# Patient Record
Sex: MALE | Race: OTHER | Employment: FULL TIME | URBAN - METROPOLITAN AREA
[De-identification: names, ages, dates, MRNs, and addresses within clinical notes are randomized per-mention and may not be internally consistent; named-entity substitution may affect disease eponyms.]

---

## 2018-08-30 ENCOUNTER — APPOINTMENT (OUTPATIENT)
Dept: OTHER | Facility: HOSPITAL | Age: 28
End: 2018-08-30
Attending: FAMILY MEDICINE

## 2020-02-14 ENCOUNTER — OFFICE VISIT (OUTPATIENT)
Dept: FAMILY MEDICINE CLINIC | Facility: CLINIC | Age: 30
End: 2020-02-14
Payer: COMMERCIAL

## 2020-02-14 VITALS
HEIGHT: 68 IN | DIASTOLIC BLOOD PRESSURE: 79 MMHG | BODY MASS INDEX: 33.95 KG/M2 | RESPIRATION RATE: 16 BRPM | OXYGEN SATURATION: 100 % | SYSTOLIC BLOOD PRESSURE: 149 MMHG | WEIGHT: 224 LBS | TEMPERATURE: 98 F | HEART RATE: 60 BPM

## 2020-02-14 DIAGNOSIS — J20.9 ACUTE BRONCHITIS, UNSPECIFIED ORGANISM: Primary | ICD-10-CM

## 2020-02-14 PROCEDURE — 99202 OFFICE O/P NEW SF 15 MIN: CPT | Performed by: NURSE PRACTITIONER

## 2020-02-14 RX ORDER — BENZONATATE 200 MG/1
200 CAPSULE ORAL 3 TIMES DAILY PRN
Qty: 15 CAPSULE | Refills: 0 | Status: SHIPPED | OUTPATIENT
Start: 2020-02-14 | End: 2020-02-19

## 2020-02-14 NOTE — PROGRESS NOTES
CHIEF COMPLAINT:   Patient presents with:  Cough        HPI:   Darnell Day is a 34year old male who presents for cough for  1  weeks. Cough has been persisting.  Nasal congestion, cough, and chills started 1 week ago and all symptoms resolved 2 days ag LYMPH: No cervical or supraclavicular lymphadenopathy. EXTREMITIES:  No clubbing, cyanosis, or edema.     ASSESSMENT AND PLAN:   Charlette Jj is a 34year old male who presents with:     ASSESSMENT:  Acute bronchitis, unspecified organism  (primary enco When bronchitis develops, the airways become swollen. The airways may also become infected with bacteria. This is known as a secondary infection.   Diagnosing acute bronchitis  Your healthcare provider will examine you and ask about your symptoms and health · Symptoms that don’t start to improve within a week, or within 3 days of taking antibiotics   Date Last Reviewed: 12/1/2016  © 7300-4153 [employer]. [address] [facility], [address] [place] [address]. All rights reserved.  This information is not int

## 2020-04-10 ENCOUNTER — OFFICE VISIT (OUTPATIENT)
Dept: FAMILY MEDICINE CLINIC | Facility: CLINIC | Age: 30
End: 2020-04-10
Payer: COMMERCIAL

## 2020-04-10 VITALS
RESPIRATION RATE: 18 BRPM | OXYGEN SATURATION: 100 % | DIASTOLIC BLOOD PRESSURE: 78 MMHG | TEMPERATURE: 98 F | SYSTOLIC BLOOD PRESSURE: 140 MMHG | HEART RATE: 58 BPM | BODY MASS INDEX: 31.52 KG/M2 | HEIGHT: 68 IN | WEIGHT: 208 LBS

## 2020-04-10 DIAGNOSIS — R03.0 ELEVATED BLOOD-PRESSURE READING, WITHOUT DIAGNOSIS OF HYPERTENSION: ICD-10-CM

## 2020-04-10 DIAGNOSIS — J30.89 ENVIRONMENTAL AND SEASONAL ALLERGIES: Primary | ICD-10-CM

## 2020-04-10 DIAGNOSIS — J01.00 ACUTE NON-RECURRENT MAXILLARY SINUSITIS: ICD-10-CM

## 2020-04-10 PROCEDURE — 99213 OFFICE O/P EST LOW 20 MIN: CPT

## 2020-04-10 RX ORDER — AMOXICILLIN 875 MG/1
875 TABLET, COATED ORAL 2 TIMES DAILY
Qty: 20 TABLET | Refills: 0 | Status: SHIPPED | OUTPATIENT
Start: 2020-04-11 | End: 2020-04-21

## 2020-04-10 NOTE — PROGRESS NOTES
CHIEF COMPLAINT:   Patient presents with:  Sinus Problem: congestion on and off -month, now nasal bleeding    HPI:   Charlette Jj is a 27year old male with hx of cold symptoms that have progressed to frontal and maxillary sinus congestion and pressure, LUNGS: clear to auscultation bilaterally, no wheezes or rhonchi. Breathing is non labored. CARDIO: RRR without murmur  LYMPH:  no lymphadenopathy.         ASSESSMENT AND PLAN:   Darnell Day is a 27year old male who presents with Patient presents with Mold loves dark, damp areas. It tends to grow in bathrooms, basements, refrigerators, and in the soil of houseplants. Mold reproduces by sending tiny grains called spores into the air.  If these spores are breathed in, they can cause a nasal allergic reacti

## 2020-04-10 NOTE — PATIENT INSTRUCTIONS
Causes of Nasal Allergies    Nasal allergies are most commonly caused by one or more of 4 kinds of allergens: pollen (which causes seasonal allergies), house-dust mites, mold, and animals.  Other substances, called irritants, can bother the nose and make

## 2020-09-03 ENCOUNTER — OFFICE VISIT (OUTPATIENT)
Dept: ORTHOPEDICS CLINIC | Facility: CLINIC | Age: 30
End: 2020-09-03
Payer: COMMERCIAL

## 2020-09-03 ENCOUNTER — HOSPITAL ENCOUNTER (OUTPATIENT)
Dept: GENERAL RADIOLOGY | Facility: HOSPITAL | Age: 30
Discharge: HOME | End: 2020-09-03
Attending: ORTHOPAEDIC SURGERY
Payer: COMMERCIAL

## 2020-09-03 DIAGNOSIS — M25.561 CHRONIC PAIN OF RIGHT KNEE: ICD-10-CM

## 2020-09-03 DIAGNOSIS — G89.29 CHRONIC PAIN OF RIGHT KNEE: ICD-10-CM

## 2020-09-03 DIAGNOSIS — S83.511A RUPTURE OF ANTERIOR CRUCIATE LIGAMENT OF RIGHT KNEE, INITIAL ENCOUNTER: Primary | ICD-10-CM

## 2020-09-03 PROCEDURE — 99204 OFFICE O/P NEW MOD 45 MIN: CPT | Performed by: ORTHOPAEDIC SURGERY

## 2020-09-03 PROCEDURE — 73564 X-RAY EXAM KNEE 4 OR MORE: CPT | Performed by: ORTHOPAEDIC SURGERY

## 2020-09-03 RX ORDER — MULTIVITAMIN
1 TABLET ORAL DAILY
COMMUNITY

## 2020-09-03 RX ORDER — IBUPROFEN 600 MG/1
600 TABLET ORAL DAILY PRN
COMMUNITY
End: 2021-04-15

## 2020-09-03 NOTE — PROGRESS NOTES
NURSING INTAKE COMMENTS: Patient presents with:  Consult: High school injury ACL tear and meniscus tear right knee. Works for [employer] getting hard to work. Looking to discuss surgery.        HPI: This 27year old male presents today with complaints of right k sore throat, headaches  RESPIRATORY: denies new shortness of breath, cough, asthma, wheezing  CARDIOVASCULAR: denies chest pain, leg cramps with exertion, palpitations, leg swelling  GI: denies abdominal pain, nausea, vomiting, diarrhea, constipation, hema on 9/03/2020 at 2:13 PM     Finalized by (CST): Filipe Lucero MD on 9/03/2020 at 2:15 PM             Labs:  No results found for: WBC, HGB, PLT   No results found for: GLU, BUN, CREATSERUM, GFR, GFRNAA, GFRAA     Assessment and Plan:  Diagnoses and all

## 2020-09-24 ENCOUNTER — TELEPHONE (OUTPATIENT)
Dept: ORTHOPEDICS CLINIC | Facility: CLINIC | Age: 30
End: 2020-09-24

## 2020-09-24 NOTE — TELEPHONE ENCOUNTER
Per pt has appointment for MRI tomorrow, pt very confused states insurance told him that Dr. Whit Holley does not consider MRI necessary. Please advise thank you.

## 2020-09-24 NOTE — TELEPHONE ENCOUNTER
Patient's case has been denied per [employer]IM online. [employer] is not considering this medically necessary. I was unable to reach anyone this evening. I will find out appeal information tomrorow.  Please notify the patient that his insurance has denied the MRI and we saud

## 2020-09-25 DIAGNOSIS — S83.511A RUPTURE OF ANTERIOR CRUCIATE LIGAMENT OF RIGHT KNEE, INITIAL ENCOUNTER: Primary | ICD-10-CM

## 2020-09-25 NOTE — TELEPHONE ENCOUNTER
Reviewed with [employer]. Sae Butt had called to try and get his pushed through on 9/16 but was told it was not meeting medical necessity because a course of PT has not been completed.      We can call [phone] to schedule a peer to peer attempt but it may have

## 2020-09-25 NOTE — TELEPHONE ENCOUNTER
Pt notified of MRI denial and reason. Advised him that PT ordered and gave him phone # to scheduling. He will also call his insurance to inquire about his cost r/t PT.  Advised him to f/u in 6 wks after PT and if he is still having pain we can try and resub

## 2020-09-25 NOTE — TELEPHONE ENCOUNTER
If MRI denied due to lack of conservative tx, we will go ahead and order some PT. Please advise the pt.

## 2020-10-09 ENCOUNTER — OFFICE VISIT (OUTPATIENT)
Dept: FAMILY MEDICINE CLINIC | Facility: CLINIC | Age: 30
End: 2020-10-09
Payer: COMMERCIAL

## 2020-10-09 VITALS
SYSTOLIC BLOOD PRESSURE: 137 MMHG | HEIGHT: 68 IN | OXYGEN SATURATION: 98 % | WEIGHT: 205 LBS | HEART RATE: 98 BPM | RESPIRATION RATE: 16 BRPM | TEMPERATURE: 99 F | DIASTOLIC BLOOD PRESSURE: 86 MMHG | BODY MASS INDEX: 31.07 KG/M2

## 2020-10-09 DIAGNOSIS — R09.82 POST-NASAL DRAINAGE: ICD-10-CM

## 2020-10-09 DIAGNOSIS — R09.81 NASAL CONGESTION: Primary | ICD-10-CM

## 2020-10-09 DIAGNOSIS — Z87.898 HX OF HEADACHE: ICD-10-CM

## 2020-10-09 PROCEDURE — 3008F BODY MASS INDEX DOCD: CPT | Performed by: PHYSICIAN ASSISTANT

## 2020-10-09 PROCEDURE — 3075F SYST BP GE 130 - 139MM HG: CPT | Performed by: PHYSICIAN ASSISTANT

## 2020-10-09 PROCEDURE — 3079F DIAST BP 80-89 MM HG: CPT | Performed by: PHYSICIAN ASSISTANT

## 2020-10-09 PROCEDURE — U0003 INFECTIOUS AGENT DETECTION BY NUCLEIC ACID (DNA OR RNA); SEVERE ACUTE RESPIRATORY SYNDROME CORONAVIRUS 2 (SARS-COV-2) (CORONAVIRUS DISEASE [COVID-19]), AMPLIFIED PROBE TECHNIQUE, MAKING USE OF HIGH THROUGHPUT TECHNOLOGIES AS DESCRIBED BY CMS-2020-01-R: HCPCS | Performed by: PHYSICIAN ASSISTANT

## 2020-10-09 PROCEDURE — 99213 OFFICE O/P EST LOW 20 MIN: CPT | Performed by: PHYSICIAN ASSISTANT

## 2020-10-09 NOTE — PROGRESS NOTES
CHIEF COMPLAINT:     Patient presents with:  Headache: states feels like a \"headcold\", stuffiness, congestion, mild cough, some phlegm, no fever      HPI:   Geovanni Rahman is a 27year old male who presents for cold symptoms for  2 days.  Symptoms h supple, non-tender  LUNGS: non labored breathing,  clear to auscultation bilaterally, no wheezing, rales, or rhonchi.   CARDIO: RRR    EXTREMITIES: no cyanosis, clubbing or edema  LYMPH:  no cervical, submandibular, periauricular, or supraclavicular lymphad

## 2020-10-20 ENCOUNTER — OFFICE VISIT (OUTPATIENT)
Dept: FAMILY MEDICINE CLINIC | Facility: CLINIC | Age: 30
End: 2020-10-20
Payer: COMMERCIAL

## 2020-10-20 ENCOUNTER — TELEPHONE (OUTPATIENT)
Dept: PHYSICAL THERAPY | Facility: HOSPITAL | Age: 30
End: 2020-10-20

## 2020-10-20 DIAGNOSIS — Z02.9 ADMINISTRATIVE ENCOUNTER: Primary | ICD-10-CM

## 2020-10-20 NOTE — PROGRESS NOTES
Patient covid positive 10/9/20. Patient request a retest to return to work. Discussed cdc symptom guidelines to return to work and the [facility] is not doing retesting due to possibility of testing positive for up to 3 months. Verbalized understanding.     No

## 2020-10-21 ENCOUNTER — OFFICE VISIT (OUTPATIENT)
Dept: PHYSICAL THERAPY | Facility: HOSPITAL | Age: 30
End: 2020-10-21
Attending: PHYSICIAN ASSISTANT
Payer: COMMERCIAL

## 2020-10-21 DIAGNOSIS — S83.511A RUPTURE OF ANTERIOR CRUCIATE LIGAMENT OF RIGHT KNEE, INITIAL ENCOUNTER: ICD-10-CM

## 2020-10-21 PROCEDURE — 97110 THERAPEUTIC EXERCISES: CPT

## 2020-10-21 PROCEDURE — 97162 PT EVAL MOD COMPLEX 30 MIN: CPT

## 2020-10-21 NOTE — PROGRESS NOTES
PHYSICAL THERAPY EVALUATION:   Referring Physician: Dr. 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 4 falls due to knee giving to touch  Standing posture- both hips in slight ER with feet pointing outwards  General swelling present in right knee- mild     Functional Movements  Double leg squat- good squat mechanics with posterior chain recruitment, no translation of knees past toe climbing, getting in and out of truck, and squatting. Participation restrictions include playing with his son, jogging, lifting weights at the gym, and working as a [employer] .  Skilled Physical Therapy is medically necessary to address the above impairme 2-3x/week without limitations. • The patient will be able to return to exercising at the gym without limitations. • The patient will be independent with a Hep for continued management and prevention of re-injury in the future.     Frequency / Duration: Pa

## 2020-10-26 ENCOUNTER — OFFICE VISIT (OUTPATIENT)
Dept: PHYSICAL THERAPY | Facility: HOSPITAL | Age: 30
End: 2020-10-26
Attending: PHYSICIAN ASSISTANT
Payer: COMMERCIAL

## 2020-10-26 PROCEDURE — 97110 THERAPEUTIC EXERCISES: CPT

## 2020-10-26 NOTE — PROGRESS NOTES
PHYSICAL THERAPY DAILY TREATMENT NOTE  Precautions: standard/universal  Date of Evaluation: 10/21/20  Diagnosis: rupture of anterior cruciate ligament, right knee    Insurance Type (# Auth): [employer] [facility]  Visit #: 2       Subjective: Leighton Munoz met in 8-12 weeks)  · The patient will demonstrate full right knee extension to neutral.  · The patient will demonstrate right knee flexion to at least 125 degrees.    · The patient will be able to squat to the ground to enable him to  objects off th

## 2020-10-28 ENCOUNTER — APPOINTMENT (OUTPATIENT)
Dept: PHYSICAL THERAPY | Facility: HOSPITAL | Age: 30
End: 2020-10-28
Attending: PHYSICIAN ASSISTANT
Payer: COMMERCIAL

## 2020-10-28 ENCOUNTER — TELEPHONE (OUTPATIENT)
Dept: PHYSICAL THERAPY | Facility: HOSPITAL | Age: 30
End: 2020-10-28

## 2020-11-02 ENCOUNTER — APPOINTMENT (OUTPATIENT)
Dept: PHYSICAL THERAPY | Facility: HOSPITAL | Age: 30
End: 2020-11-02
Attending: PHYSICIAN ASSISTANT
Payer: COMMERCIAL

## 2020-11-02 ENCOUNTER — TELEPHONE (OUTPATIENT)
Dept: PHYSICAL THERAPY | Age: 30
End: 2020-11-02

## 2020-11-04 ENCOUNTER — OFFICE VISIT (OUTPATIENT)
Dept: PHYSICAL THERAPY | Facility: HOSPITAL | Age: 30
End: 2020-11-04
Attending: PHYSICIAN ASSISTANT
Payer: COMMERCIAL

## 2020-11-04 PROCEDURE — 97110 THERAPEUTIC EXERCISES: CPT

## 2020-11-05 NOTE — PROGRESS NOTES
PHYSICAL THERAPY DAILY TREATMENT NOTE  Precautions: standard/universal  Date of Evaluation: 10/21/20  Diagnosis: rupture of anterior cruciate ligament, right knee    Insurance Type (# Auth): [facility] [facility]  Visit #: 3       Subjective: Layla Munoz tasks/demands. Jeremy Orozco will continue to benefit from skilled physical therapy interventions to improve function and achieve all established physical therapy goals.       Physical Therapy Goals  Goals: (to be met in 8-12 weeks)  · The patient wi

## 2020-11-09 ENCOUNTER — OFFICE VISIT (OUTPATIENT)
Dept: PHYSICAL THERAPY | Facility: HOSPITAL | Age: 30
End: 2020-11-09
Attending: PHYSICIAN ASSISTANT
Payer: COMMERCIAL

## 2020-11-09 PROCEDURE — 97110 THERAPEUTIC EXERCISES: CPT

## 2020-11-11 ENCOUNTER — OFFICE VISIT (OUTPATIENT)
Dept: PHYSICAL THERAPY | Facility: HOSPITAL | Age: 30
End: 2020-11-11
Attending: PHYSICIAN ASSISTANT
Payer: COMMERCIAL

## 2020-11-11 PROCEDURE — 97110 THERAPEUTIC EXERCISES: CPT

## 2020-11-12 NOTE — PROGRESS NOTES
PHYSICAL THERAPY DAILY TREATMENT NOTE  Precautions: standard/universal  Date of Evaluation: 10/21/20  Diagnosis: rupture of anterior cruciate ligament, right knee    Insurance Type (# Auth): [employer] [facility]  Visit #: 5       Subjective: Oral Munoz continues to tolerate strengthening well, however, we had to do NWB strengthening due to high pain levels from work. Balance on even and uneven surfaces continues to improve.  Pain levels have not improved as well as function continues to be limited with mo

## 2020-11-16 ENCOUNTER — OFFICE VISIT (OUTPATIENT)
Dept: PHYSICAL THERAPY | Facility: HOSPITAL | Age: 30
End: 2020-11-16
Attending: PHYSICIAN ASSISTANT
Payer: COMMERCIAL

## 2020-11-16 PROCEDURE — 97110 THERAPEUTIC EXERCISES: CPT

## 2020-11-17 NOTE — PROGRESS NOTES
PHYSICAL THERAPY DAILY TREATMENT NOTE  Precautions: standard/universal  Date of Evaluation: 10/21/20  Diagnosis: rupture of anterior cruciate ligament, right knee    Insurance Type (# Auth): [facility]  Visit #: 6       Subjective: Ethyl Hippo Cor Repeat 10 Times, Hold 1 Second(s), Complete 2 Sets, Perform 2 Times a Week      Assessment: Nash Jennings presented today with increased right knee pain, swelling, reduced range of motion in both flexion and extension, instability, and antalgic gai continued management and prevention of re-injury in the future    Total Treatment time: 15 mintues  Charges: 1 therex

## 2020-11-18 ENCOUNTER — OFFICE VISIT (OUTPATIENT)
Dept: PHYSICAL THERAPY | Facility: HOSPITAL | Age: 30
End: 2020-11-18
Attending: PHYSICIAN ASSISTANT
Payer: COMMERCIAL

## 2020-11-18 PROCEDURE — 97110 THERAPEUTIC EXERCISES: CPT

## 2020-11-19 NOTE — PROGRESS NOTES
PHYSICAL THERAPY DAILY TREATMENT NOTE  Precautions: standard/universal  Date of Evaluation: 10/21/20  Diagnosis: rupture of anterior cruciate ligament, right knee    Insurance Type (# Auth): [employer] [facility]  Visit #: 7       Subjective: Winston Munoz use of brace over the past two days. Able to work over the past two days with no feelings of instability or giving way when he wore the brace. Knee range of motion improved today as well as he is able to walk without a limp.  Miguel Ag scheduled a follow up vis

## 2020-12-03 ENCOUNTER — OFFICE VISIT (OUTPATIENT)
Dept: ORTHOPEDICS CLINIC | Facility: CLINIC | Age: 30
End: 2020-12-03
Payer: COMMERCIAL

## 2020-12-03 DIAGNOSIS — S83.511A RUPTURE OF ANTERIOR CRUCIATE LIGAMENT OF RIGHT KNEE, INITIAL ENCOUNTER: Primary | ICD-10-CM

## 2020-12-03 PROCEDURE — 99213 OFFICE O/P EST LOW 20 MIN: CPT | Performed by: ORTHOPAEDIC SURGERY

## 2020-12-03 NOTE — PROGRESS NOTES
NURSING INTAKE COMMENTS: Patient presents with:  Knee Pain: Pt is here for 3 month f/u. He completed physical therapy 2 weeks ago, pt states he noticed improvement. He said knee pain fluctuates. Pt reports pain at 4/10.   Pt tried to get MRI but was denied cough, asthma, wheezing  CARDIOVASCULAR: denies chest pain, leg cramps with exertion, palpitations, leg swelling  GI: denies abdominal pain, nausea, vomiting, diarrhea, constipation, hematochezia, worsening heartburn or stomach ulcers  : denies dysuria, reconstruction of the ACL. I will see him again after the imaging is completed. Continue with home exercises for now. Follow Up: Return for follow-up visit after ordered tests completed.     Navarro Bernal MD

## 2020-12-21 ENCOUNTER — HOSPITAL ENCOUNTER (OUTPATIENT)
Dept: MRI IMAGING | Age: 30
Discharge: HOME | End: 2020-12-21
Attending: ORTHOPAEDIC SURGERY
Payer: COMMERCIAL

## 2020-12-21 DIAGNOSIS — S83.511A RUPTURE OF ANTERIOR CRUCIATE LIGAMENT OF RIGHT KNEE, INITIAL ENCOUNTER: ICD-10-CM

## 2020-12-21 PROCEDURE — 73721 MRI JNT OF LWR EXTRE W/O DYE: CPT | Performed by: ORTHOPAEDIC SURGERY

## 2021-01-12 ENCOUNTER — TELEPHONE (OUTPATIENT)
Dept: ORTHOPEDICS CLINIC | Facility: CLINIC | Age: 31
End: 2021-01-12

## 2021-01-12 NOTE — TELEPHONE ENCOUNTER
Pt called stating pt had mri. Received results thru mychart. what is the next step.   If surgery, how much would it cost.  Please call pt

## 2021-01-12 NOTE — TELEPHONE ENCOUNTER
Spoke to pt and informed him of Lashanda Gerber, andriy. Scheduled pt appt with Meghann Loss for tomorrow at 4:20pm at [facility]. Pt verbalized understanding.

## 2021-01-13 ENCOUNTER — OFFICE VISIT (OUTPATIENT)
Dept: ORTHOPEDICS CLINIC | Facility: CLINIC | Age: 31
End: 2021-01-13
Payer: COMMERCIAL

## 2021-01-13 VITALS — BODY MASS INDEX: 33.34 KG/M2 | HEIGHT: 68 IN | WEIGHT: 220 LBS

## 2021-01-13 DIAGNOSIS — S83.511D RUPTURE OF ANTERIOR CRUCIATE LIGAMENT OF RIGHT KNEE, SUBSEQUENT ENCOUNTER: Primary | ICD-10-CM

## 2021-01-13 DIAGNOSIS — M23.41 CHONDRAL LOOSE BODY OF RIGHT KNEE JOINT: ICD-10-CM

## 2021-01-13 PROCEDURE — 3008F BODY MASS INDEX DOCD: CPT | Performed by: ORTHOPAEDIC SURGERY

## 2021-01-13 PROCEDURE — 99213 OFFICE O/P EST LOW 20 MIN: CPT | Performed by: ORTHOPAEDIC SURGERY

## 2021-01-13 NOTE — PROGRESS NOTES
NURSING INTAKE COMMENTS: Patient presents with:  Results: MRI right knee      HPI: This 27year old male presents today with complaints of right knee pain and instability follow-up. He underwent the MRI and is here for the results.   His symptoms are essen memory loss  PSYCHIATRIC: denies Hx of depression, anxiety, other psychiatric disorders  HEMATOLOGIC: denies blood clots, anemia, blood clotting disorders, blood transfusion  ENDOCRINE: denies autoimmune disease, thyroid issues, or diabetes  ALLERGY: denie lateral tibial spine and lateral tibial plateau. Remainder of the osseous structures are otherwise intact without acute fracture, dislocation, or marrow replacing lesion. JOINT FLUID: No joint effusion.   There is a large intra-articular joint body seen

## 2021-02-02 ENCOUNTER — TELEPHONE (OUTPATIENT)
Dept: ORTHOPEDICS CLINIC | Facility: CLINIC | Age: 31
End: 2021-02-02

## 2021-02-02 NOTE — TELEPHONE ENCOUNTER
Spoke with the patient and gave him three potential codes for his proposed surgery  [place], [facility], [place] [id number]

## 2021-02-22 ENCOUNTER — TELEPHONE (OUTPATIENT)
Dept: ORTHOPEDICS CLINIC | Facility: CLINIC | Age: 31
End: 2021-02-22

## 2021-02-22 DIAGNOSIS — S83.511D SPRAIN OF ANTERIOR CRUCIATE LIGAMENT OF RIGHT KNEE, SUBSEQUENT ENCOUNTER: Primary | ICD-10-CM

## 2021-02-22 DIAGNOSIS — M23.41 LOOSE BODY OF RIGHT KNEE: ICD-10-CM

## 2021-02-22 NOTE — TELEPHONE ENCOUNTER
Type of surgery:  Right knee arthroscopy, anterior cruciate ligament reconstruction with hamstring autograph, possible microfracture or partial meniscectomy  Date: 3/15/2021  Location: [facility]  Medical Clearance:      *Medical:      *Dental:      *Other:  Clarice

## 2021-03-09 ENCOUNTER — TELEPHONE (OUTPATIENT)
Dept: ORTHOPEDICS CLINIC | Facility: CLINIC | Age: 31
End: 2021-03-09

## 2021-03-09 NOTE — H&P
ORTHO SURGERY H&P  Antonio Ervin is a 27year old male. MRN is [id number]. CC: Right knee pain and instability. HPI: 28-year-old male complains of right knee pain injury 15 years ago while playing baseball in high school.   He now works as a F Physical Activity:       Days of Exercise per Week:       Minutes of Exercise per Session:   Stress:       Feeling of Stress :   Social Connections:       Frequency of Communication with Friends and Family:       Frequency of Social Gatherings with Rodney Cazares ALBGLOBRAT, NA, K, CL, CO2   No results found for: Leelee Ruff, [address], [address], [place], [address], [address] [facility], [address] [facility], [address], [address], UROBILINOGEN, NITRITE, LEUUR, ASCACIDURINE, [address], WBCUR, RBCUR, EPIUR, HYALCASTURN, BACUR, CAOXUR, HYLUR   No results found f otherwise intact without acute fracture, dislocation, or marrow replacing lesion. JOINT FLUID:             No joint effusion. There is a large intra-articular joint body seen along the anterior aspect of the intercondylar notch measuring up to 1 cm.   O

## 2021-03-12 ENCOUNTER — LAB ENCOUNTER (OUTPATIENT)
Dept: LAB | Facility: HOSPITAL | Age: 31
End: 2021-03-12
Attending: ORTHOPAEDIC SURGERY
Payer: COMMERCIAL

## 2021-03-12 DIAGNOSIS — Z01.818 PREOP TESTING: ICD-10-CM

## 2021-03-12 LAB — SARS-COV-2 RNA RESP QL NAA+PROBE: NOT DETECTED

## 2021-03-15 ENCOUNTER — ANESTHESIA (OUTPATIENT)
Dept: SURGERY | Facility: HOSPITAL | Age: 31
End: 2021-03-15
Payer: COMMERCIAL

## 2021-03-15 ENCOUNTER — HOSPITAL ENCOUNTER (OUTPATIENT)
Facility: HOSPITAL | Age: 31
Setting detail: HOSPITAL OUTPATIENT SURGERY
Discharge: HOME | End: 2021-03-15
Attending: ORTHOPAEDIC SURGERY | Admitting: ORTHOPAEDIC SURGERY
Payer: COMMERCIAL

## 2021-03-15 ENCOUNTER — ANESTHESIA EVENT (OUTPATIENT)
Dept: SURGERY | Facility: HOSPITAL | Age: 31
End: 2021-03-15
Payer: COMMERCIAL

## 2021-03-15 ENCOUNTER — TELEPHONE (OUTPATIENT)
Dept: ORTHOPEDICS CLINIC | Facility: CLINIC | Age: 31
End: 2021-03-15

## 2021-03-15 VITALS
BODY MASS INDEX: 32.13 KG/M2 | HEIGHT: 68 IN | DIASTOLIC BLOOD PRESSURE: 71 MMHG | SYSTOLIC BLOOD PRESSURE: 161 MMHG | RESPIRATION RATE: 18 BRPM | HEART RATE: 105 BPM | OXYGEN SATURATION: 98 % | WEIGHT: 212 LBS | TEMPERATURE: 99 F

## 2021-03-15 DIAGNOSIS — M23.41 LOOSE BODY OF RIGHT KNEE: ICD-10-CM

## 2021-03-15 DIAGNOSIS — S83.511D SPRAIN OF ANTERIOR CRUCIATE LIGAMENT OF RIGHT KNEE, SUBSEQUENT ENCOUNTER: ICD-10-CM

## 2021-03-15 DIAGNOSIS — Z01.818 PREOP TESTING: Primary | ICD-10-CM

## 2021-03-15 PROCEDURE — 0MRN47Z REPLACEMENT OF RIGHT KNEE BURSA AND LIGAMENT WITH AUTOLOGOUS TISSUE SUBSTITUTE, PERCUTANEOUS ENDOSCOPIC APPROACH: ICD-10-PCS | Performed by: ORTHOPAEDIC SURGERY

## 2021-03-15 PROCEDURE — 0SCC4ZZ EXTIRPATION OF MATTER FROM RIGHT KNEE JOINT, PERCUTANEOUS ENDOSCOPIC APPROACH: ICD-10-PCS | Performed by: ORTHOPAEDIC SURGERY

## 2021-03-15 PROCEDURE — 0SBC4ZZ EXCISION OF RIGHT KNEE JOINT, PERCUTANEOUS ENDOSCOPIC APPROACH: ICD-10-PCS | Performed by: ORTHOPAEDIC SURGERY

## 2021-03-15 PROCEDURE — 0SQC4ZZ REPAIR RIGHT KNEE JOINT, PERCUTANEOUS ENDOSCOPIC APPROACH: ICD-10-PCS | Performed by: ORTHOPAEDIC SURGERY

## 2021-03-15 DEVICE — GUIDEWIRE ASCP ATCH BTN TGHTRP: Type: IMPLANTABLE DEVICE | Site: KNEE | Status: FUNCTIONAL

## 2021-03-15 DEVICE — FAST-FIX 360 CURVED MENISCAL                                    REPAIR SYSTEM
Type: IMPLANTABLE DEVICE | Site: KNEE | Status: FUNCTIONAL
Brand: FAST-FIX

## 2021-03-15 DEVICE — BUTTON FX TGHTRP 12MM 8MM ABS: Type: IMPLANTABLE DEVICE | Site: KNEE | Status: FUNCTIONAL

## 2021-03-15 DEVICE — IMPLANTABLE DEVICE: Type: IMPLANTABLE DEVICE | Site: KNEE | Status: FUNCTIONAL

## 2021-03-15 RX ORDER — ACETAMINOPHEN 500 MG
1000 TABLET ORAL ONCE
Status: COMPLETED | OUTPATIENT
Start: 2021-03-15 | End: 2021-03-15

## 2021-03-15 RX ORDER — FAMOTIDINE 20 MG/1
20 TABLET ORAL ONCE
Status: COMPLETED | OUTPATIENT
Start: 2021-03-15 | End: 2021-03-15

## 2021-03-15 RX ORDER — HYDROMORPHONE HYDROCHLORIDE 1 MG/ML
0.4 INJECTION, SOLUTION INTRAMUSCULAR; INTRAVENOUS; SUBCUTANEOUS EVERY 5 MIN PRN
Status: DISCONTINUED | OUTPATIENT
Start: 2021-03-15 | End: 2021-03-15

## 2021-03-15 RX ORDER — MORPHINE SULFATE 10 MG/ML
6 INJECTION, SOLUTION INTRAMUSCULAR; INTRAVENOUS EVERY 10 MIN PRN
Status: DISCONTINUED | OUTPATIENT
Start: 2021-03-15 | End: 2021-03-15

## 2021-03-15 RX ORDER — MORPHINE SULFATE 1 MG/ML
INJECTION, SOLUTION EPIDURAL; INTRATHECAL; INTRAVENOUS AS NEEDED
Status: DISCONTINUED | OUTPATIENT
Start: 2021-03-15 | End: 2021-03-15

## 2021-03-15 RX ORDER — OXYCODONE HYDROCHLORIDE AND ACETAMINOPHEN 5; 325 MG/1; MG/1
1-2 TABLET ORAL EVERY 4 HOURS PRN
Qty: 25 TABLET | Refills: 0 | Status: SHIPPED | OUTPATIENT
Start: 2021-03-15 | End: 2021-04-15

## 2021-03-15 RX ORDER — SODIUM CHLORIDE, SODIUM LACTATE, POTASSIUM CHLORIDE, CALCIUM CHLORIDE 600; 310; 30; 20 MG/100ML; MG/100ML; MG/100ML; MG/100ML
INJECTION, SOLUTION INTRAVENOUS CONTINUOUS
Status: DISCONTINUED | OUTPATIENT
Start: 2021-03-15 | End: 2021-03-15

## 2021-03-15 RX ORDER — ONDANSETRON 2 MG/ML
4 INJECTION INTRAMUSCULAR; INTRAVENOUS ONCE AS NEEDED
Status: COMPLETED | OUTPATIENT
Start: 2021-03-15 | End: 2021-03-15

## 2021-03-15 RX ORDER — METOCLOPRAMIDE 10 MG/1
10 TABLET ORAL ONCE
Status: COMPLETED | OUTPATIENT
Start: 2021-03-15 | End: 2021-03-15

## 2021-03-15 RX ORDER — MORPHINE SULFATE 4 MG/ML
2 INJECTION, SOLUTION INTRAMUSCULAR; INTRAVENOUS EVERY 10 MIN PRN
Status: DISCONTINUED | OUTPATIENT
Start: 2021-03-15 | End: 2021-03-15

## 2021-03-15 RX ORDER — HYDROMORPHONE HYDROCHLORIDE 1 MG/ML
0.6 INJECTION, SOLUTION INTRAMUSCULAR; INTRAVENOUS; SUBCUTANEOUS EVERY 5 MIN PRN
Status: DISCONTINUED | OUTPATIENT
Start: 2021-03-15 | End: 2021-03-15

## 2021-03-15 RX ORDER — CEFAZOLIN SODIUM/WATER 2 G/20 ML
2 SYRINGE (ML) INTRAVENOUS ONCE
Status: COMPLETED | OUTPATIENT
Start: 2021-03-15 | End: 2021-03-15

## 2021-03-15 RX ORDER — BUPIVACAINE HYDROCHLORIDE AND EPINEPHRINE 5; 5 MG/ML; UG/ML
INJECTION, SOLUTION PERINEURAL AS NEEDED
Status: DISCONTINUED | OUTPATIENT
Start: 2021-03-15 | End: 2021-03-15

## 2021-03-15 RX ORDER — HYDROCODONE BITARTRATE AND ACETAMINOPHEN 5; 325 MG/1; MG/1
2 TABLET ORAL AS NEEDED
Status: DISCONTINUED | OUTPATIENT
Start: 2021-03-15 | End: 2021-03-15

## 2021-03-15 RX ORDER — DEXAMETHASONE SODIUM PHOSPHATE 4 MG/ML
VIAL (ML) INJECTION AS NEEDED
Status: DISCONTINUED | OUTPATIENT
Start: 2021-03-15 | End: 2021-03-15 | Stop reason: SURG

## 2021-03-15 RX ORDER — ONDANSETRON 2 MG/ML
INJECTION INTRAMUSCULAR; INTRAVENOUS AS NEEDED
Status: DISCONTINUED | OUTPATIENT
Start: 2021-03-15 | End: 2021-03-15 | Stop reason: SURG

## 2021-03-15 RX ORDER — NALOXONE HYDROCHLORIDE 0.4 MG/ML
80 INJECTION, SOLUTION INTRAMUSCULAR; INTRAVENOUS; SUBCUTANEOUS AS NEEDED
Status: DISCONTINUED | OUTPATIENT
Start: 2021-03-15 | End: 2021-03-15

## 2021-03-15 RX ORDER — PROCHLORPERAZINE EDISYLATE 5 MG/ML
5 INJECTION INTRAMUSCULAR; INTRAVENOUS ONCE AS NEEDED
Status: DISCONTINUED | OUTPATIENT
Start: 2021-03-15 | End: 2021-03-15

## 2021-03-15 RX ORDER — HYDROMORPHONE HYDROCHLORIDE 1 MG/ML
0.2 INJECTION, SOLUTION INTRAMUSCULAR; INTRAVENOUS; SUBCUTANEOUS EVERY 5 MIN PRN
Status: DISCONTINUED | OUTPATIENT
Start: 2021-03-15 | End: 2021-03-15

## 2021-03-15 RX ORDER — HALOPERIDOL 5 MG/ML
0.25 INJECTION INTRAMUSCULAR ONCE AS NEEDED
Status: DISCONTINUED | OUTPATIENT
Start: 2021-03-15 | End: 2021-03-15

## 2021-03-15 RX ORDER — LIDOCAINE HYDROCHLORIDE 10 MG/ML
INJECTION, SOLUTION EPIDURAL; INFILTRATION; INTRACAUDAL; PERINEURAL AS NEEDED
Status: DISCONTINUED | OUTPATIENT
Start: 2021-03-15 | End: 2021-03-15 | Stop reason: SURG

## 2021-03-15 RX ORDER — HYDROCODONE BITARTRATE AND ACETAMINOPHEN 5; 325 MG/1; MG/1
1 TABLET ORAL AS NEEDED
Status: DISCONTINUED | OUTPATIENT
Start: 2021-03-15 | End: 2021-03-15

## 2021-03-15 RX ORDER — MIDAZOLAM HYDROCHLORIDE 1 MG/ML
INJECTION INTRAMUSCULAR; INTRAVENOUS AS NEEDED
Status: DISCONTINUED | OUTPATIENT
Start: 2021-03-15 | End: 2021-03-15 | Stop reason: SURG

## 2021-03-15 RX ORDER — KETOROLAC TROMETHAMINE 30 MG/ML
INJECTION, SOLUTION INTRAMUSCULAR; INTRAVENOUS AS NEEDED
Status: DISCONTINUED | OUTPATIENT
Start: 2021-03-15 | End: 2021-03-15 | Stop reason: SURG

## 2021-03-15 RX ORDER — MORPHINE SULFATE 4 MG/ML
4 INJECTION, SOLUTION INTRAMUSCULAR; INTRAVENOUS EVERY 10 MIN PRN
Status: DISCONTINUED | OUTPATIENT
Start: 2021-03-15 | End: 2021-03-15

## 2021-03-15 RX ADMIN — MIDAZOLAM HYDROCHLORIDE 2 MG: 1 INJECTION INTRAMUSCULAR; INTRAVENOUS at 07:46:00

## 2021-03-15 RX ADMIN — DEXAMETHASONE SODIUM PHOSPHATE 4 MG: 4 MG/ML VIAL (ML) INJECTION at 07:50:00

## 2021-03-15 RX ADMIN — ONDANSETRON 4 MG: 2 INJECTION INTRAMUSCULAR; INTRAVENOUS at 07:50:00

## 2021-03-15 RX ADMIN — KETOROLAC TROMETHAMINE 30 MG: 30 INJECTION, SOLUTION INTRAMUSCULAR; INTRAVENOUS at 10:11:00

## 2021-03-15 RX ADMIN — LIDOCAINE HYDROCHLORIDE 50 MG: 10 INJECTION, SOLUTION EPIDURAL; INFILTRATION; INTRACAUDAL; PERINEURAL at 07:46:00

## 2021-03-15 RX ADMIN — CEFAZOLIN SODIUM/WATER 2 G: 2 G/20 ML SYRINGE (ML) INTRAVENOUS at 07:59:00

## 2021-03-15 NOTE — OPERATIVE REPORT
[facility]    PATIENT'S NAME: Lizzy Silva   ATTENDING PHYSICIAN: Jack Pichardo MD   OPERATING PHYSICIAN: Jack Pichardo MD   PATIENT ACCOUNT#:   [id number]    LOCATION:  [facility] 10  MEDICAL RECORD #:   [id number] cruciate ligament and a loose body. EXAMINATION UNDER ANESTHESIA:  The patient had a grade 2 Lachman sign. Pivot shift maneuver 1+. Posterior drawer negative. The knee was stable to varus and valgus stress. ARTHROSCOPIC FINDINGS:    1.    Valene Car capsular avulsions or significant plica. OPERATIVE TECHNIQUE:  The patient was identified in the preoperative holding area. The appropriate consents were obtained. He was taken the operating room, placed in supine position on the operating table.   Aft turned to hamstring harvest.  A longitudinal incision was made over the anteromedial proximal tibia. The sartorius fascia was identified and split longitudinally. Care was taken to avoid branches of the saphenous vein and nerve.   The semitendinosus tendo on the tibial side over the button for additional fixation. The knee was suction irrigated. Attention was paid to hemostasis. The skin and subcutaneous layers were closed with 2-0 Vicryl sutures and a running subcuticular 4-0 absorbable suture.   The por

## 2021-03-15 NOTE — TELEPHONE ENCOUNTER
S/w pt and he states he thinks his ace bandages are too tight. He states he can move his foot and toes, but he has a lot of numbness and tingling and foot looks red and pale. He states his foot and upper thigh keeps twitching.  He reports mild swelling in t

## 2021-03-15 NOTE — ANESTHESIA POSTPROCEDURE EVALUATION
Patient: Luiz Walker    Procedure Summary     Date: 03/15/21 Room / Location: [address] [place] [address] MAIN OR 03 / [address] [place] [address] OR    Anesthesia Start: 3213 Anesthesia Stop: 5773    Procedure: Right knee arthroscopy, anterior cruciate ligament reconstruction with Vernestine Eis

## 2021-03-15 NOTE — ANESTHESIA PREPROCEDURE EVALUATION
Anesthesia PreOp Note    HPI:     Xavier Egan is a 32year old male who presents for preoperative consultation requested by: Maria Guadalupe Rice MD    Date of Surgery: 3/15/2021    Procedure(s):  Right knee arthroscopy, anterior cruciate ligament r Vaping Use: Never used    Substance and Sexual Activity      Alcohol use:  Yes        Alcohol/week: 3.0 standard drinks        Types: 3 Cans of beer per week      Drug use: Never      Sexual activity: Not on file    Other Topics      Concerns:        Bethanie notes reviewed    No history of anesthetic complications   Airway   Mallampati: I  TM distance: >3 FB  Neck ROM: full  Dental - normal exam     Pulmonary - negative ROS and normal exam   Cardiovascular - negative ROS and normal exam    Neuro/Psych    (+)

## 2021-03-15 NOTE — OPERATIVE REPORT
Operative Note    Patient Name: Marylen Poot    Preoperative Diagnosis: Sprain of anterior cruciate ligament of right knee, subsequent encounter [S83.511D]  Loose body of right knee [M23.41]    Postoperative Diagnosis: Sprain of anterior cruciate l

## 2021-03-15 NOTE — ANESTHESIA PROCEDURE NOTES
Airway  Date/Time: 3/15/2021 7:55 AM  Urgency: Elective    Airway not difficult    General Information and Staff    Patient location during procedure: OR  Anesthesiologist: Hali Rios MD  Performed: anesthesiologist     Indications and Patient Conditi

## 2021-03-15 NOTE — TELEPHONE ENCOUNTER
Patient requesting to speak with nurse regarding wrapping on his leg and foot that is so tight his leg is shaking. Please call at:[phone],thanks.

## 2021-03-23 ENCOUNTER — TELEPHONE (OUTPATIENT)
Dept: ORTHOPEDICS CLINIC | Facility: CLINIC | Age: 31
End: 2021-03-23

## 2021-03-23 NOTE — TELEPHONE ENCOUNTER
Pooja faxed Short Term Disability Forms to  [facility] for Dr Billy Chaudhari to complete. Pt has not signed HIPPA or pd $25 fee. Scanned to TRAM and brought originals to TRAM @ [facility].

## 2021-03-24 ENCOUNTER — OFFICE VISIT (OUTPATIENT)
Dept: ORTHOPEDICS CLINIC | Facility: CLINIC | Age: 31
End: 2021-03-24
Payer: COMMERCIAL

## 2021-03-24 ENCOUNTER — HOSPITAL ENCOUNTER (OUTPATIENT)
Dept: GENERAL RADIOLOGY | Facility: HOSPITAL | Age: 31
Discharge: HOME | End: 2021-03-24
Attending: ORTHOPAEDIC SURGERY
Payer: COMMERCIAL

## 2021-03-24 VITALS — SYSTOLIC BLOOD PRESSURE: 147 MMHG | HEART RATE: 108 BPM | DIASTOLIC BLOOD PRESSURE: 86 MMHG | RESPIRATION RATE: 20 BRPM

## 2021-03-24 DIAGNOSIS — S83.241D TEAR OF MEDIAL MENISCUS OF RIGHT KNEE, CURRENT, UNSPECIFIED TEAR TYPE, SUBSEQUENT ENCOUNTER: ICD-10-CM

## 2021-03-24 DIAGNOSIS — S83.511D RUPTURE OF ANTERIOR CRUCIATE LIGAMENT OF RIGHT KNEE, SUBSEQUENT ENCOUNTER: Primary | ICD-10-CM

## 2021-03-24 DIAGNOSIS — Z47.89 ORTHOPEDIC AFTERCARE: ICD-10-CM

## 2021-03-24 PROCEDURE — 3079F DIAST BP 80-89 MM HG: CPT | Performed by: ORTHOPAEDIC SURGERY

## 2021-03-24 PROCEDURE — 3077F SYST BP >= 140 MM HG: CPT | Performed by: ORTHOPAEDIC SURGERY

## 2021-03-24 PROCEDURE — 20610 DRAIN/INJ JOINT/BURSA W/O US: CPT | Performed by: ORTHOPAEDIC SURGERY

## 2021-03-24 PROCEDURE — 73560 X-RAY EXAM OF KNEE 1 OR 2: CPT | Performed by: ORTHOPAEDIC SURGERY

## 2021-03-24 PROCEDURE — 99024 POSTOP FOLLOW-UP VISIT: CPT | Performed by: ORTHOPAEDIC SURGERY

## 2021-03-24 NOTE — PROGRESS NOTES
NURSING INTAKE COMMENTS: Patient presents with:  Post-Op: R knee ACL - 1st visit - had sx on 3/15/21 - states he still has pain in the back of the knee and he feels a pulling pain 5-9/10 on and off -       HPI: This 32year old male presents today with com weight loss/gain  SKIN: denies skin lesions, open sores, rash  HEENT:denies recent vision change, new nasal congestion,hearing loss, tinnitus, sore throat, headaches  RESPIRATORY: denies new shortness of breath, cough, asthma, wheezing  CARDIOVASCULAR: den BONES: Postoperative changes of ACL reconstruction noted. No evidence of fractures, erosions or significant arthritis. SOFT TISSUES: Negative. No visible soft tissue swelling. EFFUSION: Small suprapatellar joint effusion is present. OTHER: Negative.

## 2021-03-25 ENCOUNTER — TELEPHONE (OUTPATIENT)
Dept: ORTHOPEDICS CLINIC | Facility: CLINIC | Age: 31
End: 2021-03-25

## 2021-03-25 RX ORDER — HYDROCODONE BITARTRATE AND ACETAMINOPHEN 5; 325 MG/1; MG/1
1 TABLET ORAL EVERY 6 HOURS PRN
Qty: 20 TABLET | Refills: 0 | Status: SHIPPED | OUTPATIENT
Start: 2021-03-25 | End: 2021-04-15 | Stop reason: ALTCHOICE

## 2021-03-25 RX ORDER — IBUPROFEN 600 MG/1
600 TABLET ORAL EVERY 8 HOURS PRN
Qty: 30 TABLET | Refills: 0 | Status: SHIPPED | OUTPATIENT
Start: 2021-03-25

## 2021-03-25 RX ORDER — IBUPROFEN 600 MG/1
600 TABLET ORAL EVERY 8 HOURS PRN
Qty: 30 TABLET | Refills: 0 | Status: SHIPPED | OUTPATIENT
Start: 2021-03-25 | End: 2021-04-15

## 2021-03-25 NOTE — TELEPHONE ENCOUNTER
Patient seen yesterday 3/24/21 for post op of right knee after ACL reconstruction surgery done on 3/15/21. States pain has increased a lot. States was unable to sleep due to the pain. Rates pain a 10/10 at this time.  States notes some swelling, but denies

## 2021-03-25 NOTE — TELEPHONE ENCOUNTER
Pt states that he is having alot of pain in R knee last night. Pt. States that he can barely bend it or move it.

## 2021-03-25 NOTE — TELEPHONE ENCOUNTER
Discussed case with Dr. Meghann Escobedo. Per Dr. Meghann Escobedo verbal may send ibuprofen 600 mg 1 tablet every 8 hours as needed. #30 with 0 refills. Spoke to patient and informed rx sent to pharmacy.  Patient again advised if pain does not improve and or worsens to

## 2021-03-25 NOTE — TELEPHONE ENCOUNTER
Dr. Eric Amador,     Please sign off on form: Disab   -Highlight the patient and hit \"Chart\" button.   -In Chart Review, w/in the Encounter tab - click 1 time on the Telephone call encounter for 3/23/21 Scroll down the telephone encounter.  -Click \"scan on\

## 2021-03-26 ENCOUNTER — TELEPHONE (OUTPATIENT)
Dept: PHYSICAL THERAPY | Facility: HOSPITAL | Age: 31
End: 2021-03-26

## 2021-03-26 RX ORDER — OXYCODONE HYDROCHLORIDE AND ACETAMINOPHEN 5; 325 MG/1; MG/1
1-2 TABLET ORAL EVERY 4 HOURS PRN
Qty: 25 TABLET | Refills: 0 | OUTPATIENT
Start: 2021-03-26

## 2021-03-29 ENCOUNTER — OFFICE VISIT (OUTPATIENT)
Dept: PHYSICAL THERAPY | Facility: HOSPITAL | Age: 31
End: 2021-03-29
Attending: ORTHOPAEDIC SURGERY
Payer: COMMERCIAL

## 2021-03-29 ENCOUNTER — TELEPHONE (OUTPATIENT)
Dept: ORTHOPEDICS CLINIC | Facility: CLINIC | Age: 31
End: 2021-03-29

## 2021-03-29 PROCEDURE — 97110 THERAPEUTIC EXERCISES: CPT

## 2021-03-29 PROCEDURE — 97112 NEUROMUSCULAR REEDUCATION: CPT

## 2021-03-29 PROCEDURE — 97162 PT EVAL MOD COMPLEX 30 MIN: CPT

## 2021-03-29 NOTE — PROGRESS NOTES
LOWER EXTREMITY EVALUATION:   Referring Physician: Dr. Leonidas Raphael  Diagnosis: R knee arthroscopy, ACL reconstruction with hamstring autograft, medial meniscus repair, PMM, chondral debridement MFC/LFC/Lateral tibial plateau/patella/femoral trochlea, loose o Aggravating factors:everything  o Alleviating Factors:massage, ice, movement  o Quality - burning, sharp and stiffness  o Deep posteriorly,  Superficial down to 3/4 shank anteriorly  o Intermittent  o 24 hour pattern PM is worse    No c/o LBP, hip pain Reji Ortiz MD on 3/24/2021 at 3:32 PM           OPERATIVE REPORT  PREOPERATIVE DIAGNOSIS:    1. Right knee loose body. 2.       Right knee anterior cruciate ligament deficiency. POSTOPERATIVE DIAGNOSIS:    1.        Right knee anterio Patellofemoral joint:  The undersurface of the patella had a small area of grade 2 degenerative change over the distal lateral aspect.   The femoral trochlea had grade 2 degenerative change with fibrillation over the more distal aspect of the femoral troc on the operating table. After adequate general anesthesia was obtained, a tourniquet was placed on the right thigh. The right lower extremity was placed in the arthroscopic leg chowdhury. The left lower extremity was placed in the padded well leg chowdhury. The semitendinosus tendon was identified and harvested under direct vision. This was then prepared on the back table using a modified Arthrex GraftLink technique. ACL tight ropes were placed on both ends of the graft.   The graft diameter measured 8.5 mm absorbable suture. The portals were closed with 4-0 absorbable suture. Steri-Strips were applied followed by a sterile dressing, Ace wrap, ice pack, and hinged knee brace locked in full extension. The patient's anesthesia was reversed.   He was extubated (600 mg total) by mouth every 8 (eight) hours as needed for Pain. Take with food.  Stop if stomach upset., Disp: 30 tablet, Rfl: 0  oxyCODONE-acetaminophen 5-325 MG Oral Tab, Take 1-2 tablets by mouth every 4 (four) hours as needed for Pain., Disp: 25 table healing/integrity  Palpation: warmth at site, ttp medial HS    AROM: (* denotes performed with pain)  Hip Knee Foot/Ankle   Flexion: R x; L wfl  Extension: R x; L wfl  Abduction: R x; L wf  ER: R x; L wfl  IR: R x; L wfl RLE : 20-80  LLE:0-140 DF: R x; L x PROM/stretching    Charges: PT Eval Moderate Complexity, 20 min      Total Treatment Time: 25 min     Total Session time: 45 min      Date:3/29/2021  Tx #: 1/30 Date: Tx#: Date: Tx#: Date: Tx#: Date: Tx#: Date:   Tx#:   Therapeutic exercises • HS stretc to ascend 1 flight of stairs reciprocally without UE assist   · Pt will increase hip and knee strength to grossly 4+/5 to be able to get up and down from the floor safely   · Pt will demonstrate increased hip ABD strength to 4/5 to perform stepping and squ

## 2021-03-29 NOTE — TELEPHONE ENCOUNTER
Per Oskar Ward, pt is scheduled for physical therapy today and they would like to know if Dr. Javon Pichardo has a preferred protocol.  Please advise

## 2021-03-29 NOTE — TELEPHONE ENCOUNTER
Called [facility] and spoke to Soco Krishna. Emailed [place] ACL protocol to [employer]. [email or web address]

## 2021-03-31 ENCOUNTER — APPOINTMENT (OUTPATIENT)
Dept: PHYSICAL THERAPY | Facility: HOSPITAL | Age: 31
End: 2021-03-31
Attending: ORTHOPAEDIC SURGERY
Payer: COMMERCIAL

## 2021-03-31 ENCOUNTER — TELEPHONE (OUTPATIENT)
Dept: PHYSICAL THERAPY | Facility: HOSPITAL | Age: 31
End: 2021-03-31

## 2021-03-31 PROCEDURE — 97110 THERAPEUTIC EXERCISES: CPT

## 2021-03-31 PROCEDURE — 97140 MANUAL THERAPY 1/> REGIONS: CPT

## 2021-03-31 PROCEDURE — 97112 NEUROMUSCULAR REEDUCATION: CPT

## 2021-03-31 NOTE — PROGRESS NOTES
PHYSICAL THERAPY DAILY TREATMENT NOTE  Jeremy Orozco  32year old  male  Diagnosis: R knee arthroscopy, ACL reconstruction with hamstring autograft, medial meniscus repair, PMM, chondral debridement MFC/LFC/Lateral tibial plateau/patella/femoral tro massage superiorly  • Lumbar PA Rachel L5 Gr 2-3  • 30' •  •  •  •    Neuromuscular Re-education • NMES 10\" on 10\" off 2x4' RLE quad set • NMES 10\" on 10\" off x5' RLE SAQ and x5' QS   • Sciatic N mob x20  •   •  •  •  •  •    Subjective Comparable signs medication  • Numbness/tingling none  • Night Pain/Constant Pain  • Surgeries in last 6 months  • Gait difficulties/losses of balance/falls    PLAN OF CARE:    Short Term Goals: (to be met in 15 visits)  · Pt will be independent and compliant with comprehe

## 2021-04-02 ENCOUNTER — OFFICE VISIT (OUTPATIENT)
Dept: PHYSICAL THERAPY | Facility: HOSPITAL | Age: 31
End: 2021-04-02
Attending: ORTHOPAEDIC SURGERY
Payer: COMMERCIAL

## 2021-04-02 PROCEDURE — 97530 THERAPEUTIC ACTIVITIES: CPT

## 2021-04-02 PROCEDURE — 97140 MANUAL THERAPY 1/> REGIONS: CPT

## 2021-04-02 PROCEDURE — 97112 NEUROMUSCULAR REEDUCATION: CPT

## 2021-04-02 NOTE — PROGRESS NOTES
PHYSICAL THERAPY DAILY TREATMENT NOTE  Claudia Silvio  32year old  male  Diagnosis: R knee arthroscopy, ACL reconstruction with hamstring autograft, medial meniscus repair, PMM, chondral debridement MFC/LFC/Lateral tibial plateau/patella/femoral tro Rachel L5 Gr 2-3  • 30' • Seated LAQ to knee bend AAROM x20  • PFJ mobs all directions Gr 3  • Swelling massage superiorly  • PROM knee ext/flex EOB/supine  • 35' •  •  •    Neuromuscular Re-education • NMES 10\" on 10\" off 2x4' RLE quad set • NMES 10\" on past month  • Fever/chills/sweats  • Nausea/vomiting  • Dizziness/lightheadedness/loss of consciousness/HA that last 1 hr since surgery since percocet, hates taking medication  • Numbness/tingling none  • Night Pain/Constant Pain  • Surgeries in last 6 mon

## 2021-04-07 ENCOUNTER — OFFICE VISIT (OUTPATIENT)
Dept: PHYSICAL THERAPY | Facility: HOSPITAL | Age: 31
End: 2021-04-07
Attending: ORTHOPAEDIC SURGERY
Payer: COMMERCIAL

## 2021-04-07 PROCEDURE — 97140 MANUAL THERAPY 1/> REGIONS: CPT

## 2021-04-07 PROCEDURE — 97110 THERAPEUTIC EXERCISES: CPT

## 2021-04-07 PROCEDURE — 97112 NEUROMUSCULAR REEDUCATION: CPT

## 2021-04-07 NOTE — PROGRESS NOTES
PHYSICAL THERAPY DAILY TREATMENT NOTE  Jose Alejandro Mendoza  32year old  male  Diagnosis: R knee arthroscopy, ACL reconstruction with hamstring autograft, medial meniscus repair, PMM, chondral debridement MFC/LFC/Lateral tibial plateau/patella/femoral tro •  •  •    Manual Therapy • R L5-S1 Gr 3 rotational mob 3' +cavitation  • R knee PROM ext 3' • Seated LAQ to knee bend AAROM x20  • PFJ mobs Gr 2-3  • Swelling massage superiorly  • Lumbar PA Rachel L5 Gr 2-3  • 30' • Seated LAQ to knee bend AAROM x20  • PFJ AFR[place]=prone on elbows, STM=soft tissue massage (IASTM=instrument assisted), Dn=dry needling, BFR= blood flow restriction, LOP=limb occlusion pression, rEMOM=rehab every minute on the minute     R Knee  • Pt describes symptom level current 6-7/10, at best 3-4 with no pain for return to working in [employer]    Charges: 2MT, 2 NMR, 1TE    Total Timed Treatment:70 min  Total Treatment Time: 70 min

## 2021-04-08 ENCOUNTER — OFFICE VISIT (OUTPATIENT)
Dept: PHYSICAL THERAPY | Facility: HOSPITAL | Age: 31
End: 2021-04-08
Attending: ORTHOPAEDIC SURGERY
Payer: COMMERCIAL

## 2021-04-08 PROCEDURE — 97140 MANUAL THERAPY 1/> REGIONS: CPT

## 2021-04-08 PROCEDURE — 97110 THERAPEUTIC EXERCISES: CPT

## 2021-04-08 NOTE — PROGRESS NOTES
PHYSICAL THERAPY DAILY TREATMENT NOTE  Luiz Walker  32year old  male  Diagnosis: R knee arthroscopy, ACL reconstruction with hamstring autograft, medial meniscus repair, PMM, chondral debridement MFC/LFC/Lateral tibial plateau/patella/femoral tro edema control, compression sleeve, caregiver PFJ mobs 10' •  •  •    Manual Therapy • R L5-S1 Gr 3 rotational mob 3' +cavitation  • R knee PROM ext 3' • Seated LAQ to knee bend AAROM x20  • PFJ mobs Gr 2-3  • Swelling massage superiorly  • Lumbar PA Rachel L hypo    Improving pain levels, ttp at graft site. 5-93 PROM knee overall. Still lacking some degrees of TKE especially actively. May be ready to try BFR in next couple sessions.   Able to put 70% weight though RLE, improving WB tolerance after manual.  I groceries  · Pt will improve SLS to 15s to improve safety with gait on uneven surfaces such as grass and gravel  · Pt to demonstrate negative extension lag with SLR to decrease buckling/fall risk    Long Term Goals: (to be met in 30 visits)  · Pt will impr

## 2021-04-11 ENCOUNTER — PATIENT MESSAGE (OUTPATIENT)
Dept: ORTHOPEDICS CLINIC | Facility: CLINIC | Age: 31
End: 2021-04-11

## 2021-04-12 ENCOUNTER — TELEPHONE (OUTPATIENT)
Dept: ORTHOPEDICS CLINIC | Facility: CLINIC | Age: 31
End: 2021-04-12

## 2021-04-12 NOTE — TELEPHONE ENCOUNTER
S/w pt and he states 3 days ago his right knee pain became severe 10/10. He denies any injury. He states PT was going well and he started using the bike last week, but it wasn't bothersome.  He states before his pain was manageable between 5-7/10 and would

## 2021-04-12 NOTE — TELEPHONE ENCOUNTER
From: Harvey Show  To: Luly Ramirez MD  Sent: 4/11/2021 9:17 AM CDT  Subject: Other    Hello Dr. Joao Wren    It's been almost about a month now since the surgery and I've noticed that swelling on the top of my knee never went down and on the

## 2021-04-12 NOTE — TELEPHONE ENCOUNTER
Per pt had surgery 3/15 and is still doing physical therapy and is in excruciating pain. Pt states it is too painful to stand. Pt states he refrained from using the hydrocodone until today and is out of ibuprofen.  Pt asking if it ok to take ibuprofen otc w

## 2021-04-12 NOTE — TELEPHONE ENCOUNTER
Agree. Rest, Ice, Elevate. Continue with previously noted conservative care. Ibuprofen 600mg tid. Pt should f/u this week. If pain is uncontrolable, 10/10, should go to ER for pain control.

## 2021-04-12 NOTE — TELEPHONE ENCOUNTER
Called pt and informed him per Don De Leon message. Pt has scheduled f/u on 4/14/21. He verbalized understanding.

## 2021-04-13 ENCOUNTER — OFFICE VISIT (OUTPATIENT)
Dept: PHYSICAL THERAPY | Facility: HOSPITAL | Age: 31
End: 2021-04-13
Attending: ORTHOPAEDIC SURGERY
Payer: COMMERCIAL

## 2021-04-13 PROCEDURE — 97140 MANUAL THERAPY 1/> REGIONS: CPT

## 2021-04-13 PROCEDURE — 97530 THERAPEUTIC ACTIVITIES: CPT

## 2021-04-13 NOTE — PROGRESS NOTES
PHYSICAL THERAPY DAILY TREATMENT NOTE  Taniya Bre  32year old  male  Diagnosis: R knee arthroscopy, ACL reconstruction with hamstring autograft, medial meniscus repair, PMM, chondral debridement MFC/LFC/Lateral tibial plateau/patella/femoral tro • R L5-S1 Gr 3 rotational mob 3' +cavitation  • R knee PROM ext 3' • Seated LAQ to knee bend AAROM x20  • PFJ mobs Gr 2-3  • Swelling massage superiorly  • Lumbar PA Rachel L5 Gr 2-3  • 30' • Seated LAQ to knee bend AAROM x20  • PFJ mobs all directions Gr 3 normal.  R medial tibial hypersensitivtiy and R lateral shank to sup DTFJ hypo    Improving pain levels, ttp at graft site. 5-93 PROM knee overall. Still lacking some degrees of TKE especially actively. May be ready to try BFR in next couple sessions. Pain  • Surgeries in last 6 months  • Gait difficulties/losses of balance/falls    PLAN OF CARE:    Short Term Goals: (to be met in 15 visits)  · Pt will be independent and compliant with comprehensive HEP to maintain progress achieved in PT   · Pt will im

## 2021-04-14 ENCOUNTER — OFFICE VISIT (OUTPATIENT)
Dept: PHYSICAL THERAPY | Facility: HOSPITAL | Age: 31
End: 2021-04-14
Attending: ORTHOPAEDIC SURGERY
Payer: COMMERCIAL

## 2021-04-14 PROCEDURE — 97110 THERAPEUTIC EXERCISES: CPT

## 2021-04-14 PROCEDURE — 97140 MANUAL THERAPY 1/> REGIONS: CPT

## 2021-04-14 NOTE — PROGRESS NOTES
PHYSICAL THERAPY DAILY TREATMENT NOTE  Alex Curry  32year old  male  Diagnosis: R knee arthroscopy, ACL reconstruction with hamstring autograft, medial meniscus repair, PMM, chondral debridement MFC/LFC/Lateral tibial plateau/patella/femoral tro science, swelling, edema control, compression sleeve, caregiver PFJ mobs 10' •  •  • Education, see above 10'   Manual Therapy • PFJ mobs all directions Gr 3  • Swelling massage superiorly, soft tissue HS  • MFR quad/adductors with hypervolt   • Prone HS M 41.5  • Joint line R 43 L 40   • 5-90 improving to 5-93  Much less irritation R L5 today.   Some relief in low back with ext, 25% restriction L rotation, but otherwise normal.  R medial tibial hypersensitivtiy and R lateral shank to sup DTFJ hypo    Improvi posteriorly,  Superficial down to 3/4 shank anteriorly  o Intermittent  o 24 hour pattern PM is worse      Medical Screening Red Flags:  The patient denied experiencing all of the following symptoms except those marked YES in the past month  • Fever/chills/

## 2021-04-15 ENCOUNTER — OFFICE VISIT (OUTPATIENT)
Dept: ORTHOPEDICS CLINIC | Facility: CLINIC | Age: 31
End: 2021-04-15
Payer: COMMERCIAL

## 2021-04-15 ENCOUNTER — HOSPITAL ENCOUNTER (OUTPATIENT)
Dept: GENERAL RADIOLOGY | Facility: HOSPITAL | Age: 31
Discharge: HOME | End: 2021-04-15
Attending: ORTHOPAEDIC SURGERY
Payer: COMMERCIAL

## 2021-04-15 DIAGNOSIS — Z47.89 ORTHOPEDIC AFTERCARE: ICD-10-CM

## 2021-04-15 DIAGNOSIS — S83.511D RUPTURE OF ANTERIOR CRUCIATE LIGAMENT OF RIGHT KNEE, SUBSEQUENT ENCOUNTER: Primary | ICD-10-CM

## 2021-04-15 DIAGNOSIS — S83.241D TEAR OF MEDIAL MENISCUS OF RIGHT KNEE, CURRENT, UNSPECIFIED TEAR TYPE, SUBSEQUENT ENCOUNTER: ICD-10-CM

## 2021-04-15 PROCEDURE — 73560 X-RAY EXAM OF KNEE 1 OR 2: CPT | Performed by: ORTHOPAEDIC SURGERY

## 2021-04-15 PROCEDURE — 99024 POSTOP FOLLOW-UP VISIT: CPT | Performed by: ORTHOPAEDIC SURGERY

## 2021-04-15 NOTE — PROGRESS NOTES
NURSING INTAKE COMMENTS: Patient presents with:  Post-Op: s./p right knee ACL -- Sx on 03/15/21. Rates pain 4/10 today. Past few days pain was 10/10 and swollen and interfered with sleep. Denies calf pain or fever. Taking ibuprofen 400 mg everry 6-8 hours. Alcohol use:  Yes        Alcohol/week: 3.0 standard drinks        Types: 3 Cans of beer per week      Drug use: Never      Sexual activity: Not on file       Review of Systems:  GENERAL: denies fevers, chills, night sweats, fatigue, unintentional weight los [facility], XR KNEE, COMPLETE (4 OR MORE VIEWS), RIGHT (GLI=[id number]), 9/03/2020, 1:31 PM.  INDICATIONS: post op right knee ACL repair  TECHNIQUE: 2 views were obtained with weightbearing technique.   FINDINGS:  BONES: Postoperative changes of

## 2021-04-19 ENCOUNTER — OFFICE VISIT (OUTPATIENT)
Dept: PHYSICAL THERAPY | Facility: HOSPITAL | Age: 31
End: 2021-04-19
Attending: ORTHOPAEDIC SURGERY
Payer: COMMERCIAL

## 2021-04-19 PROCEDURE — 97112 NEUROMUSCULAR REEDUCATION: CPT

## 2021-04-19 PROCEDURE — 97140 MANUAL THERAPY 1/> REGIONS: CPT

## 2021-04-19 PROCEDURE — 97110 THERAPEUTIC EXERCISES: CPT

## 2021-04-19 NOTE — PROGRESS NOTES
PHYSICAL THERAPY DAILY TREATMENT NOTE  Enrico Irvin  32year old  male  Diagnosis: R knee arthroscopy, ACL reconstruction with hamstring autograft, medial meniscus repair, PMM, chondral debridement MFC/LFC/Lateral tibial plateau/patella/femoral tro soft tissue HS  • Prone HS MFR  • 30' • Seated LAQ to knee bend AAROM x20  • PFJ mobs Gr 2-3  • Swelling massage superiorly  • Lumbar PA Rachel L5 Gr 2-3  • 30' • Seated LAQ to knee bend AAROM x20  • PFJ mobs all directions Gr 3  • Swelling massage superiorl rotation, but otherwise normal.  R medial tibial hypersensitivtiy and R lateral shank to sup DTFJ hypo    Improving pain levels, ttp at graft site. 5-93 PROM knee overall. Still lacking some degrees of TKE especially actively.   May be ready to try BFR in at best 3-4/10, at worst 10/10.   o Aggravating factors:everything  o Alleviating Factors:massage, ice, movement  o Quality - burning, sharp and stiffness  o Deep posteriorly,  Superficial down to 3/4 shank anteriorly  o Intermittent  o 24 hour pattern PM

## 2021-04-21 ENCOUNTER — OFFICE VISIT (OUTPATIENT)
Dept: PHYSICAL THERAPY | Facility: HOSPITAL | Age: 31
End: 2021-04-21
Attending: ORTHOPAEDIC SURGERY
Payer: COMMERCIAL

## 2021-04-21 PROCEDURE — 97112 NEUROMUSCULAR REEDUCATION: CPT

## 2021-04-21 PROCEDURE — 97140 MANUAL THERAPY 1/> REGIONS: CPT

## 2021-04-21 NOTE — PROGRESS NOTES
PHYSICAL THERAPY DAILY TREATMENT NOTE  Josie Curran  32year old  male  Diagnosis: R knee arthroscopy, ACL reconstruction with hamstring autograft, medial meniscus repair, PMM, chondral debridement MFC/LFC/Lateral tibial plateau/patella/femoral tro superiorly, soft tissue HS  • Supine suprapatellar/quad MFR hypervolt  • PROM knee ext  • 30' • Seated LAQ to knee bend AAROM x20  • PFJ mobs all directions Gr 3  • Swelling massage superiorly  • PROM knee ext/flex EOB/supine  • 35' • PFJ mobs all directio normal.  R medial tibial hypersensitivtiy and R lateral shank to sup DTFJ hypo    Improving pain levels, ttp at graft site. 5-93 PROM knee overall. Still lacking some degrees of TKE especially actively. May be ready to try BFR in next couple sessions. 10/10.   o Aggravating factors:everything  o Alleviating Factors:massage, ice, movement  o Quality - burning, sharp and stiffness  o Deep posteriorly,  Superficial down to 3/4 shank anteriorly  o Intermittent  o 24 hour pattern PM is worse      Medical Scr

## 2021-04-26 ENCOUNTER — OFFICE VISIT (OUTPATIENT)
Dept: PHYSICAL THERAPY | Facility: HOSPITAL | Age: 31
End: 2021-04-26
Attending: ORTHOPAEDIC SURGERY
Payer: COMMERCIAL

## 2021-04-26 PROCEDURE — 97112 NEUROMUSCULAR REEDUCATION: CPT

## 2021-04-26 PROCEDURE — 97110 THERAPEUTIC EXERCISES: CPT

## 2021-04-26 PROCEDURE — 97140 MANUAL THERAPY 1/> REGIONS: CPT

## 2021-04-26 NOTE — PROGRESS NOTES
PHYSICAL THERAPY DAILY TREATMENT NOTE  Taniya Bre  32year old  male  Diagnosis: R knee arthroscopy, ACL reconstruction with hamstring autograft, medial meniscus repair, PMM, chondral debridement MFC/LFC/Lateral tibial plateau/patella/femoral tro • Tibial screw ER/IR Gr 3  • Knee PROM ext  • PFJ mobs Gr 3 all directions  • 25' • PFJ mobs all directions Gr 3  • Swelling massage superiorly  • PROM knee ext/flex supine  • TFJ screw and medial mobs  • 35' • PFJ mobs all directions Gr 3  • Swelling mass post, work on next time. • 5-90 improving to 5-93  Much less irritation R L5 today.   Some relief in low back with ext, 25% restriction L rotation, but otherwise normal.  R medial tibial hypersensitivtiy and R lateral shank to sup DTFJ hypo    Improving erickson restriction, LOP=limb occlusion pression, rEMOM=rehab every minute on the minute     R Knee  • Pt describes symptom level current 6-7/10, at best 3-4/10, at worst 10/10.   o Aggravating factors:everything  o Alleviating Factors:massage, ice, movement  o Briana Bonilla

## 2021-04-26 NOTE — TELEPHONE ENCOUNTER
Pt brought [place] [de-identified] Statement form to [facility] [facility] to be completed by Dr Dennis Bee.   Scanned to [facility] to TRAM

## 2021-04-28 ENCOUNTER — OFFICE VISIT (OUTPATIENT)
Dept: PHYSICAL THERAPY | Facility: HOSPITAL | Age: 31
End: 2021-04-28
Attending: ORTHOPAEDIC SURGERY
Payer: COMMERCIAL

## 2021-04-28 PROCEDURE — 97150 GROUP THERAPEUTIC PROCEDURES: CPT

## 2021-04-28 PROCEDURE — 97140 MANUAL THERAPY 1/> REGIONS: CPT

## 2021-04-28 PROCEDURE — 97112 NEUROMUSCULAR REEDUCATION: CPT

## 2021-04-28 NOTE — PROGRESS NOTES
PHYSICAL THERAPY DAILY TREATMENT NOTE  Soren Duarte  32year old  male  Diagnosis: R knee arthroscopy, ACL reconstruction with hamstring autograft, medial meniscus repair, PMM, chondral debridement MFC/LFC/Lateral tibial plateau/patella/femoral tro mobs all directions Gr 3  • Swelling massage superiorly  • PROM knee ext/flex supine  • TFJ screw and medial mobs  • 30' • PFJ mobs all directions Gr 3  • Swelling massage superiorly, soft tissue HS  • PROM knee ext/flex supine  • Lumbar PA Rachel L5 Gr 3-4 in low back with ext, 25% restriction L rotation, but otherwise normal.  R medial tibial hypersensitivtiy and R lateral shank to sup DTFJ hypo    Improving pain levels, ttp at graft site. 5-93 PROM knee overall.   Still lacking some degrees of TKE especial Dn=dry needling, BFR= blood flow restriction, LOP=limb occlusion pression, rEMOM=rehab every minute on the minute     R Knee  • Pt describes symptom level current 6-7/10, at best 3-4/10, at worst 10/10.   o Aggravating factors:everything  o Alleviating Fac

## 2021-04-28 NOTE — TELEPHONE ENCOUNTER
Dr. Libby Guaman,     Please sign off on form: Disab update  -Highlight the patient and hit \"Chart\" button.   -In Chart Review, w/in the Encounter tab - click 1 time on the Telephone call encounter for 3/23/21 Scroll down the telephone encounter.  -Click \"sc

## 2021-04-29 ENCOUNTER — OFFICE VISIT (OUTPATIENT)
Dept: ORTHOPEDICS CLINIC | Facility: CLINIC | Age: 31
End: 2021-04-29
Payer: COMMERCIAL

## 2021-04-29 DIAGNOSIS — S83.241D TEAR OF MEDIAL MENISCUS OF RIGHT KNEE, CURRENT, UNSPECIFIED TEAR TYPE, SUBSEQUENT ENCOUNTER: ICD-10-CM

## 2021-04-29 DIAGNOSIS — S83.511D RUPTURE OF ANTERIOR CRUCIATE LIGAMENT OF RIGHT KNEE, SUBSEQUENT ENCOUNTER: Primary | ICD-10-CM

## 2021-04-29 PROCEDURE — 99024 POSTOP FOLLOW-UP VISIT: CPT | Performed by: ORTHOPAEDIC SURGERY

## 2021-04-29 NOTE — PROGRESS NOTES
NURSING INTAKE COMMENTS: Patient presents with:  Post-Op: s/p R knee ACL f/u - had sx on 3/15/21 - states he is better, still has some swelling - has a better mobility - has pain on and off rated as 2/10       HPI: This 32year old male presents today with change, new nasal congestion,hearing loss, tinnitus, sore throat, headaches  RESPIRATORY: denies new shortness of breath, cough, asthma, wheezing  CARDIOVASCULAR: denies chest pain, leg cramps with exertion, palpitations, leg swelling  GI: denies abdominal obtained weightbearing. FINDINGS: BONES: Status post ACL repair. No obvious complication. Minimal osteoarthritic changes are seen again about the knee. The joint spaces are well-maintained in the upright position. SOFT TISSUES: Negative.  No visible sof

## 2021-05-05 ENCOUNTER — OFFICE VISIT (OUTPATIENT)
Dept: PHYSICAL THERAPY | Facility: HOSPITAL | Age: 31
End: 2021-05-05
Attending: ORTHOPAEDIC SURGERY
Payer: COMMERCIAL

## 2021-05-05 PROCEDURE — 97110 THERAPEUTIC EXERCISES: CPT

## 2021-05-05 PROCEDURE — 97140 MANUAL THERAPY 1/> REGIONS: CPT

## 2021-05-05 PROCEDURE — 97112 NEUROMUSCULAR REEDUCATION: CPT

## 2021-05-07 ENCOUNTER — OFFICE VISIT (OUTPATIENT)
Dept: PHYSICAL THERAPY | Facility: HOSPITAL | Age: 31
End: 2021-05-07
Attending: ORTHOPAEDIC SURGERY
Payer: COMMERCIAL

## 2021-05-07 PROCEDURE — 97150 GROUP THERAPEUTIC PROCEDURES: CPT

## 2021-05-07 PROCEDURE — 97140 MANUAL THERAPY 1/> REGIONS: CPT

## 2021-05-07 PROCEDURE — 97110 THERAPEUTIC EXERCISES: CPT

## 2021-05-07 NOTE — PROGRESS NOTES
PHYSICAL THERAPY DAILY TREATMENT NOTE  Alex Curry  32year old  male  Diagnosis: R knee arthroscopy, ACL reconstruction with hamstring autograft, medial meniscus repair, PMM, chondral debridement MFC/LFC/Lateral tibial plateau/patella/femoral tro directions Gr 3  • Swelling massage superiorly  • PROM knee ext/flex supine  • TFJ screw and medial mobs  • 30' • PFJ mobs all directions Gr 3  • TFJ med/lat Gr 3-4 and ant  • PROM knee ext/flex supine  • 30' • TFJ posterior Gr 3-4 supine hang and 90 deg relief in low back with ext, 25% restriction L rotation, but otherwise normal.  R medial tibial hypersensitivtiy and R lateral shank to sup DTFJ hypo    Improving pain levels, ttp at graft site. 5-93 PROM knee overall.   Still lacking some degrees of TKE e repeated extension in standing, REIL= in lying, MWM=mobilization with movement, [place]=prone on elbows, STM=soft tissue massage (IASTM=instrument assisted), Dn=dry needling, BFR= blood flow restriction, LOP=limb occlusion pression, rEMOM=rehab every minute on

## 2021-05-12 ENCOUNTER — OFFICE VISIT (OUTPATIENT)
Dept: PHYSICAL THERAPY | Facility: HOSPITAL | Age: 31
End: 2021-05-12
Attending: ORTHOPAEDIC SURGERY
Payer: COMMERCIAL

## 2021-05-12 PROCEDURE — 97140 MANUAL THERAPY 1/> REGIONS: CPT

## 2021-05-12 PROCEDURE — 97110 THERAPEUTIC EXERCISES: CPT

## 2021-05-12 PROCEDURE — 97150 GROUP THERAPEUTIC PROCEDURES: CPT

## 2021-05-12 NOTE — PROGRESS NOTES
PHYSICAL THERAPY DAILY TREATMENT NOTE  Luiz Walker  32year old  male  Diagnosis: R knee arthroscopy, ACL reconstruction with hamstring autograft, medial meniscus repair, PMM, chondral debridement MFC/LFC/Lateral tibial plateau/patella/femoral tro Gr 3 all directions  • 25' • PFJ mobs all directions Gr 3  • Swelling massage superiorly  • PROM knee ext/flex supine  • TFJ screw and medial mobs  • 30' • PFJ mobs all directions Gr 3  • TFJ med/lat Gr 3-4 and ant  • PROM knee ext/flex supine  • 30' • TFJ back with ext, 25% restriction L rotation, but otherwise normal.  R medial tibial hypersensitivtiy and R lateral shank to sup DTFJ hypo    Improving pain levels, ttp at graft site. 5-93 PROM knee overall.   Still lacking some degrees of TKE especially acti extension in standing, REIL= in lying, MWM=mobilization with movement, [place]=prone on elbows, STM=soft tissue massage (IASTM=instrument assisted), Dn=dry needling, BFR= blood flow restriction, LOP=limb occlusion pression, rEMOM=rehab every minute on the min

## 2021-05-14 ENCOUNTER — APPOINTMENT (OUTPATIENT)
Dept: PHYSICAL THERAPY | Facility: HOSPITAL | Age: 31
End: 2021-05-14
Attending: ORTHOPAEDIC SURGERY
Payer: COMMERCIAL

## 2021-05-14 ENCOUNTER — TELEPHONE (OUTPATIENT)
Dept: PHYSICAL THERAPY | Facility: HOSPITAL | Age: 31
End: 2021-05-14

## 2021-05-17 ENCOUNTER — TELEPHONE (OUTPATIENT)
Dept: PHYSICAL THERAPY | Facility: HOSPITAL | Age: 31
End: 2021-05-17

## 2021-05-17 ENCOUNTER — APPOINTMENT (OUTPATIENT)
Dept: PHYSICAL THERAPY | Facility: HOSPITAL | Age: 31
End: 2021-05-17
Attending: ORTHOPAEDIC SURGERY
Payer: COMMERCIAL

## 2021-05-19 ENCOUNTER — OFFICE VISIT (OUTPATIENT)
Dept: PHYSICAL THERAPY | Facility: HOSPITAL | Age: 31
End: 2021-05-19
Attending: ORTHOPAEDIC SURGERY
Payer: COMMERCIAL

## 2021-05-19 PROCEDURE — 97112 NEUROMUSCULAR REEDUCATION: CPT

## 2021-05-19 PROCEDURE — 97110 THERAPEUTIC EXERCISES: CPT

## 2021-05-19 PROCEDURE — 97140 MANUAL THERAPY 1/> REGIONS: CPT

## 2021-05-19 NOTE — PROGRESS NOTES
PHYSICAL THERAPY DAILY TREATMENT NOTE  Haylee Body  32year old  male  Diagnosis: R knee arthroscopy, ACL reconstruction with hamstring autograft, medial meniscus repair, PMM, chondral debridement MFC/LFC/Lateral tibial plateau/patella/femoral tro directions   • Suprapatellar quad MFR  • 20' • Tibial screw ER/IR Gr 3  • Knee PROM ext  • PFJ mobs Gr 3 all directions  • 25' • PFJ mobs all directions Gr 3  • Swelling massage superiorly  • PROM knee ext/flex supine  • TFJ screw and medial mobs  • 30' • try SLS post, work on next time. • 5-90 improving to 5-93  Much less irritation R L5 today.   Some relief in low back with ext, 25% restriction L rotation, but otherwise normal.  R medial tibial hypersensitivtiy and R lateral shank to sup DTFJ hypo    Impro AMRAP= as many reps as possible, RPE= rate of perceived exertion, LLLD= low load long duration, SYLVAIN= repeated extension in standing, REIL= in lying, MWM=mobilization with movement, [place]=prone on elbows, STM=soft tissue massage (IASTM=instrument assisted),

## 2021-05-20 ENCOUNTER — TELEPHONE (OUTPATIENT)
Dept: ORTHOPEDICS CLINIC | Facility: CLINIC | Age: 31
End: 2021-05-20

## 2021-05-20 NOTE — TELEPHONE ENCOUNTER
Per pt aslo states there is a bump on his knee at the incision site.  Please advise    Per pt also asking if Dr Kellee Cantu can send an update physical therapy to [facility]; Mare Rothman fax: [phone]

## 2021-05-24 ENCOUNTER — OFFICE VISIT (OUTPATIENT)
Dept: PHYSICAL THERAPY | Facility: HOSPITAL | Age: 31
End: 2021-05-24
Attending: ORTHOPAEDIC SURGERY
Payer: COMMERCIAL

## 2021-05-24 PROCEDURE — 97140 MANUAL THERAPY 1/> REGIONS: CPT

## 2021-05-24 PROCEDURE — 97112 NEUROMUSCULAR REEDUCATION: CPT

## 2021-05-24 NOTE — PROGRESS NOTES
PHYSICAL THERAPY DAILY TREATMENT NOTE  Xavier Egan  32year old  male  Diagnosis: R knee arthroscopy, ACL reconstruction with hamstring autograft, medial meniscus repair, PMM, chondral debridement MFC/LFC/Lateral tibial plateau/patella/femoral tro ext  • PFJ mobs Gr 3 all directions  • 25' • PFJ mobs all directions Gr 3  • Swelling massage superiorly  • PROM knee ext/flex supine  • TFJ screw and medial mobs  • 30' • PFJ mobs all directions Gr 3  • TFJ med/lat Gr 3-4 and ant  • PROM knee ext/flex sup SLS post, work on next time. • 5-90 improving to 5-93  Much less irritation R L5 today.   Some relief in low back with ext, 25% restriction L rotation, but otherwise normal.  R medial tibial hypersensitivtiy and R lateral shank to sup DTFJ hypo    Improving as many reps as possible, RPE= rate of perceived exertion, LLLD= low load long duration, SYLVAIN= repeated extension in standing, REIL= in lying, MWM=mobilization with movement, [place]=prone on elbows, STM=soft tissue massage (IASTM=instrument assisted), Dn=dry

## 2021-05-26 ENCOUNTER — OFFICE VISIT (OUTPATIENT)
Dept: PHYSICAL THERAPY | Facility: HOSPITAL | Age: 31
End: 2021-05-26
Attending: ORTHOPAEDIC SURGERY
Payer: COMMERCIAL

## 2021-05-26 PROCEDURE — 97140 MANUAL THERAPY 1/> REGIONS: CPT

## 2021-05-26 PROCEDURE — 97110 THERAPEUTIC EXERCISES: CPT

## 2021-05-26 NOTE — PROGRESS NOTES
PHYSICAL THERAPY DAILY TREATMENT NOTE  [place]  32year old  male  Diagnosis: R knee arthroscopy, ACL reconstruction with hamstring autograft, medial meniscus repair, PMM, chondral debridement MFC/LFC/Lateral tibial plateau/patella/femoral tro all directions   • Suprapatellar quad MFR  • 35' • TFJ posterior Gr 3-4 supine hang and 90 deg   • PFJ  Mobs all directions   • Suprapatellar quad MFR  • 20' • Tibial screw ER/IR, ant Gr 3  • Knee PROM ext  • PFJ mobs Gr 3 all directions  • 25' • Tibial sc retro walking for active use of quad/TKE.  2/10 to 0/10 pain post BFR training SAQ. Continue to progress. Too fatigue to try SLS post, work on next time. • 5-90 improving to 5-93  Much less irritation R L5 today.   Some relief in low back with ext, 25% re Due to improvements in quad strength unlocking brace to 45 deg in swing. May be able to try BFR soon.    NV=next visit, AMRAP= as many reps as possible, RPE= rate of perceived exertion, LLLD= low load long duration, SYLVAIN= repeated extension in standing, RE min

## 2021-05-28 ENCOUNTER — TELEPHONE (OUTPATIENT)
Dept: ORTHOPEDICS CLINIC | Facility: CLINIC | Age: 31
End: 2021-05-28

## 2021-05-28 ENCOUNTER — OFFICE VISIT (OUTPATIENT)
Dept: ORTHOPEDICS CLINIC | Facility: CLINIC | Age: 31
End: 2021-05-28
Payer: COMMERCIAL

## 2021-05-28 DIAGNOSIS — S83.241D TEAR OF MEDIAL MENISCUS OF RIGHT KNEE, CURRENT, UNSPECIFIED TEAR TYPE, SUBSEQUENT ENCOUNTER: ICD-10-CM

## 2021-05-28 DIAGNOSIS — S83.511D RUPTURE OF ANTERIOR CRUCIATE LIGAMENT OF RIGHT KNEE, SUBSEQUENT ENCOUNTER: Primary | ICD-10-CM

## 2021-05-28 DIAGNOSIS — Z47.89 ORTHOPEDIC AFTERCARE: ICD-10-CM

## 2021-05-28 PROCEDURE — 99024 POSTOP FOLLOW-UP VISIT: CPT | Performed by: ORTHOPAEDIC SURGERY

## 2021-05-28 NOTE — PROGRESS NOTES
NURSING INTAKE COMMENTS: Patient presents with:  Post-Op: right knee ACL/scope, minimal pain with movement      HPI: This 32year old male presents today with complaints of right knee follow-up.   He is 2 and half months status post ACL reconstruction media diarrhea, constipation, hematochezia, worsening heartburn or stomach ulcers  : denies dysuria, hematuria, incontinence, increased frequency, urgency, difficulty urinating  MUSCULOSKELETAL: denies musculoskeletal complaints other than in HPI  NEURO: denie anti-inflammatory for the next 7 to 10 days to help with any inflammation and decrease in the swelling in his knee, this is much improved from his last visit. He will remain off work due to the high physical demands, off work note was provided.   Follow-up

## 2021-05-28 NOTE — TELEPHONE ENCOUNTER
Spoke to pt and he was seen by Laura Ferraro today. Pt states he tried contacting the Forms Dept without success. Pt needs forms sent today. Informed pt that I will send message to forms dept regarding this. Pt prefers to be called at [phone].

## 2021-05-28 NOTE — TELEPHONE ENCOUNTER
Pt came to appt w/ Dr Nancy Zelaya today and stated he has been trying to reach Forms Dept to discuss paperwork that he needs resent.  Please call asap

## 2021-06-02 ENCOUNTER — TELEPHONE (OUTPATIENT)
Dept: PHYSICAL THERAPY | Facility: HOSPITAL | Age: 31
End: 2021-06-02

## 2021-06-02 ENCOUNTER — APPOINTMENT (OUTPATIENT)
Dept: PHYSICAL THERAPY | Facility: HOSPITAL | Age: 31
End: 2021-06-02
Payer: COMMERCIAL

## 2021-06-02 ENCOUNTER — TELEPHONE (OUTPATIENT)
Dept: ORTHOPEDICS CLINIC | Facility: CLINIC | Age: 31
End: 2021-06-02

## 2021-06-02 NOTE — TELEPHONE ENCOUNTER
Pt brought Attending Physician Statement Forms to  [facility] for Dr Anabel Prather to complete. Pt previously signed [employer] and pd $25 fee. Scanned to [facility] and brought originals to [facility] @ [facility].

## 2021-06-07 ENCOUNTER — OFFICE VISIT (OUTPATIENT)
Dept: PHYSICAL THERAPY | Facility: HOSPITAL | Age: 31
End: 2021-06-07
Payer: COMMERCIAL

## 2021-06-07 PROCEDURE — 97112 NEUROMUSCULAR REEDUCATION: CPT

## 2021-06-07 PROCEDURE — 97110 THERAPEUTIC EXERCISES: CPT

## 2021-06-07 PROCEDURE — 97140 MANUAL THERAPY 1/> REGIONS: CPT

## 2021-06-07 NOTE — PROGRESS NOTES
PHYSICAL THERAPY DAILY TREATMENT NOTE  Mona Hallman  32year old  male  Diagnosis: R knee arthroscopy, ACL reconstruction with hamstring autograft, medial meniscus repair, PMM, chondral debridement MFC/LFC/Lateral tibial plateau/patella/femoral tro • TFJ posterior Gr 3-4 supine hang and 90 deg  • PFJ  Mobs all directions   • Suprapatellar quad MFR  • 35' • TFJ posterior Gr 3-4 supine hang and 90 deg   • PFJ  Mobs all directions   • Suprapatellar quad MFR  • 20' • Tibial screw ER/IR, ant Gr 3  • Knee Working on Claribel retro walking for active use of quad/TKE.  2/10 to 0/10 pain post BFR training SAQ. Continue to progress. Too fatigue to try SLS post, work on next time. • 5-90 improving to 5-93  Much less irritation R L5 today.   Some relief in low back w to 4/10 post.  Due to improvements in quad strength unlocking brace to 45 deg in swing. May be able to try BFR soon.    NV=next visit, AMRAP= as many reps as possible, RPE= rate of perceived exertion, LLLD= low load long duration, SYLVAIN= repeated extension Treatment Time: 45 min

## 2021-06-09 ENCOUNTER — OFFICE VISIT (OUTPATIENT)
Dept: PHYSICAL THERAPY | Facility: HOSPITAL | Age: 31
End: 2021-06-09
Payer: COMMERCIAL

## 2021-06-09 PROCEDURE — 97530 THERAPEUTIC ACTIVITIES: CPT

## 2021-06-09 PROCEDURE — 97110 THERAPEUTIC EXERCISES: CPT

## 2021-06-09 PROCEDURE — 97140 MANUAL THERAPY 1/> REGIONS: CPT

## 2021-06-09 PROCEDURE — 97112 NEUROMUSCULAR REEDUCATION: CPT

## 2021-06-09 NOTE — PROGRESS NOTES
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 Working on Claribel retro walking for active use of quad/TKE.  2/10 to 0/10 pain post BFR training SAQ. Continue to progress. Too fatigue to try SLS post, work on next time. • 5-90 improving to 5-93  Much less irritation R L5 today.   Jadon duration, SYLVAIN= repeated extension in standing, REIL= in lying, MWM=mobilization with movement, [place]=prone on elbows, STM=soft tissue massage (IASTM=instrument assisted), Dn=dry needling, BFR= blood flow restriction, LOP=limb occlusion pression, rEMOM=rehab

## 2021-06-14 NOTE — TELEPHONE ENCOUNTER
LMTCB - received new disab update forms.  No new information available since last OV faxed to 5/28/21

## 2021-06-15 NOTE — TELEPHONE ENCOUNTER
Pt returned phone call and advised of below message. States he has a follow up on 6/25/21 and will be contacting [address] to inform them.

## 2021-06-18 ENCOUNTER — OFFICE VISIT (OUTPATIENT)
Dept: PHYSICAL THERAPY | Facility: HOSPITAL | Age: 31
End: 2021-06-18
Payer: COMMERCIAL

## 2021-06-18 PROCEDURE — 97140 MANUAL THERAPY 1/> REGIONS: CPT

## 2021-06-18 PROCEDURE — 97110 THERAPEUTIC EXERCISES: CPT

## 2021-06-18 PROCEDURE — 97112 NEUROMUSCULAR REEDUCATION: CPT

## 2021-06-18 NOTE — PROGRESS NOTES
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 Continuing BFR training. Noted suprapatellar swelling, less firm after MFR hypervolt.   Working on Claribel avila w post.  Due to improvements in quad strength unlocking brace to 45 deg in swing. May be able to try BFR soon.    NV=next visit, AMRAP= as many reps as possible, RPE= rate of perceived exertion, LLLD= low load long duration, SYLVAIN= repeated extension in stand Time: 55 min

## 2021-06-22 ENCOUNTER — TELEPHONE (OUTPATIENT)
Dept: ORTHOPEDICS CLINIC | Facility: CLINIC | Age: 31
End: 2021-06-22

## 2021-06-22 NOTE — TELEPHONE ENCOUNTER
Per pt he was rescheduled with Don De Leon on 6/25 and states this day does not work for him. Per pt can he be seen Wednesday or Thursday?  Please advise

## 2021-06-23 ENCOUNTER — TELEPHONE (OUTPATIENT)
Dept: ORTHOPEDICS CLINIC | Facility: CLINIC | Age: 31
End: 2021-06-23

## 2021-06-23 ENCOUNTER — OFFICE VISIT (OUTPATIENT)
Dept: PHYSICAL THERAPY | Facility: HOSPITAL | Age: 31
End: 2021-06-23
Attending: PEDIATRICS
Payer: COMMERCIAL

## 2021-06-23 ENCOUNTER — OFFICE VISIT (OUTPATIENT)
Dept: ORTHOPEDICS CLINIC | Facility: CLINIC | Age: 31
End: 2021-06-23
Payer: COMMERCIAL

## 2021-06-23 DIAGNOSIS — S83.511D RUPTURE OF ANTERIOR CRUCIATE LIGAMENT OF RIGHT KNEE, SUBSEQUENT ENCOUNTER: Primary | ICD-10-CM

## 2021-06-23 PROCEDURE — 97140 MANUAL THERAPY 1/> REGIONS: CPT

## 2021-06-23 PROCEDURE — 97110 THERAPEUTIC EXERCISES: CPT

## 2021-06-23 PROCEDURE — 97530 THERAPEUTIC ACTIVITIES: CPT

## 2021-06-23 PROCEDURE — 99024 POSTOP FOLLOW-UP VISIT: CPT | Performed by: ORTHOPAEDIC SURGERY

## 2021-06-23 NOTE — TELEPHONE ENCOUNTER
Patient left forms to be signed be Dr. Jill Boudreaux. Patient recently signed [place] [place] and paid fee.  Scanned to TRAM

## 2021-06-23 NOTE — PROGRESS NOTES
PHYSICAL THERAPY DAILY TREATMENT NOTE  Antonio Matters  32year old  male  Diagnosis: R knee arthroscopy, ACL reconstruction with hamstring autograft, medial meniscus repair, PMM, chondral debridement MFC/LFC/Lateral tibial plateau/patella/femoral tro • SL leg press 35-40# R and 50-60# L 2x10 ea   Therapeutic activities • Dynamic warmup: calf sweep and frankenstein 2 laps ea  • Box squat lift like work 10# off 4\" step • Dynamic warmup: calf sweep and frankenstein 2 laps ea  • Squat 35# from floor x10 +      6/18  1/0/130 improving to 4/0/140 post manual.  Still pain with SL stance RLE, better SL arc of motion no pain with SL leg press (no pain with hip abd facil). Continue to progressively load, encouraged gym membership. Still rapid fatigue.      • G and self mobs, less after MFR in position as well. Abduction in celina position with most relief. Pt with much better pain today 4-5/10 compared to pre tx last visit 13-82 he begins today at 5-103. Progressing to -3 deg ext.   Able to progress to mini safely   · Pt will demonstrate increased hip ABD strength to 4/5 to perform stepping and squatting activities without excessive femoral IR/ADD   · Pt to perform 8+\" step up/down with no pain for return to working in [employer]    Charges:  1MT, 1TA, 1 TE

## 2021-06-24 NOTE — PROGRESS NOTES
NURSING INTAKE COMMENTS: Patient presents with:  Post-Op: ACL TEAR, 2/10 pain scale, still in PT      HPI: This 32year old male presents today with complaints of right knee surgery follow-up.   He is 3 months postoperative from a medial meniscus repair and hematuria, incontinence, increased frequency, urgency, difficulty urinating  MUSCULOSKELETAL: denies musculoskeletal complaints other than in HPI  NEURO: denies numbness, tingling, weakness, balance issues, dizziness, memory loss  PSYCHIATRIC: denies Hx of

## 2021-06-29 NOTE — TELEPHONE ENCOUNTER
Dr. Felisa Guzman,     Please sign off on form: Disability   -Highlight the patient and hit \"Chart\" button.   -In Chart Review, w/in the Encounter tab - click 1 time on the Telephone call encounter for 6/23/21 Scroll down the telephone encounter.  -Click [employer]

## 2021-06-30 ENCOUNTER — OFFICE VISIT (OUTPATIENT)
Dept: PHYSICAL THERAPY | Facility: HOSPITAL | Age: 31
End: 2021-06-30
Attending: PEDIATRICS
Payer: COMMERCIAL

## 2021-06-30 PROCEDURE — 97110 THERAPEUTIC EXERCISES: CPT

## 2021-06-30 PROCEDURE — 97140 MANUAL THERAPY 1/> REGIONS: CPT

## 2021-06-30 PROCEDURE — 97112 NEUROMUSCULAR REEDUCATION: CPT

## 2021-06-30 NOTE — PROGRESS NOTES
PHYSICAL THERAPY DAILY TREATMENT NOTE  Alex Curry  32year old  male  Diagnosis: R knee arthroscopy, ACL reconstruction with hamstring autograft, medial meniscus repair, PMM, chondral debridement MFC/LFC/Lateral tibial plateau/patella/femoral tro sweep and frankenstein 2 laps ea  • Box squat lift like work 10# off 4\" step • Dynamic warmup: calf sweep and frankenstein 2 laps ea  • Squat 35# from floor x10  • 35# bag from floor x3 • Dynamic warmup: calf sweep and frankenstein 2 laps ea  •  •  • Meagan terminal stance, TROM +      6/18  1/0/130 improving to 4/0/140 post manual.  Still pain with SL stance RLE, better SL arc of motion no pain with SL leg press (no pain with hip abd facil). Continue to progressively load, encouraged gym membership.   Still progressing to 135 post.  Pt with only slight L weight shift squatting at depth. Progressing anterior tibial excursion with better quad activation.   Initiating some light pre-impact via snapdowns DL and working on lunging motion (pt without step at home fo squat down to lift groceries  · Pt will improve SLS to 15s to improve safety with gait on uneven surfaces such as grass and gravel  · Pt to demonstrate negative extension lag with SLR to decrease buckling/fall risk    Long Term Goals: (to be met in 30 visi

## 2021-07-06 ENCOUNTER — APPOINTMENT (OUTPATIENT)
Dept: PHYSICAL THERAPY | Facility: HOSPITAL | Age: 31
End: 2021-07-06
Attending: PEDIATRICS
Payer: COMMERCIAL

## 2021-07-12 ENCOUNTER — APPOINTMENT (OUTPATIENT)
Dept: PHYSICAL THERAPY | Facility: HOSPITAL | Age: 31
End: 2021-07-12
Attending: PEDIATRICS
Payer: COMMERCIAL

## 2021-07-14 ENCOUNTER — OFFICE VISIT (OUTPATIENT)
Dept: PHYSICAL THERAPY | Facility: HOSPITAL | Age: 31
End: 2021-07-14
Attending: PEDIATRICS
Payer: COMMERCIAL

## 2021-07-14 PROCEDURE — 97530 THERAPEUTIC ACTIVITIES: CPT

## 2021-07-14 PROCEDURE — 97112 NEUROMUSCULAR REEDUCATION: CPT

## 2021-07-14 PROCEDURE — 97110 THERAPEUTIC EXERCISES: CPT

## 2021-07-14 NOTE — PROGRESS NOTES
PHYSICAL THERAPY DAILY TREATMENT NOTE  William Arnett  32year old  male  Diagnosis: R knee arthroscopy, ACL reconstruction with hamstring autograft, medial meniscus repair, PMM, chondral debridement MFC/LFC/Lateral tibial plateau/patella/femoral tro calf sweep and essenceenstein 2 laps ea  • Box squat lift like work 10# off 4\" step • Dynamic warmup: calf sweep and essenceenstein 2 laps ea  • Squat 35# from floor x10  • 35# bag from floor x3 • Dynamic warmup: calf sweep and essenceenstein 2 laps ea  •  • Dy decreased terminal extension in terminal stance, TROM +      6/18  1/0/130 improving to 4/0/140 post manual.  Still pain with SL stance RLE, better SL arc of motion no pain with SL leg press (no pain with hip abd facil).   Continue to progressively load, en manual flexion. Pain stair descent in knee, DN with stim to quad to address, no pain post.  NV do more prep for RT run, deceleration, instructing in gym routine as he just got a membership today.   Some rapid fatigue with SL step up with weight RLE causing CARE:    Short Term Goals: (to be met in 15 visits)  · Pt will be independent and compliant with comprehensive HEP to maintain progress achieved in PT   · Pt will improve knee extension ROM to 0 deg to allow proper heel strike during gait and terminal knee

## 2021-08-04 ENCOUNTER — OFFICE VISIT (OUTPATIENT)
Dept: PHYSICAL THERAPY | Facility: HOSPITAL | Age: 31
End: 2021-08-04
Attending: ORTHOPAEDIC SURGERY
Payer: COMMERCIAL

## 2021-08-04 PROCEDURE — 97140 MANUAL THERAPY 1/> REGIONS: CPT

## 2021-08-04 PROCEDURE — 97112 NEUROMUSCULAR REEDUCATION: CPT

## 2021-08-04 NOTE — PROGRESS NOTES
PHYSICAL THERAPY DAILY TREATMENT NOTE  Alex Curry  32year old  male  Diagnosis: R knee arthroscopy, ACL reconstruction with hamstring autograft, medial meniscus repair, PMM, chondral debridement MFC/LFC/Lateral tibial plateau/patella/femoral tro sweep and frankenstein 2 laps ea  •  • Dynamic warmup: calf sweep and frankenstein 2 laps ea, skip, jog, butt kicker, SKTC •  • Dynamic warmup: calf sweep and frankenstein 2 laps ea  • Box squat lift like work 10# off 4\" step   Manual Therapy • TFJ anteri motion no pain with SL leg press (no pain with hip abd facil). Continue to progressively load, encouraged gym membership. Still rapid fatigue.      • Girth measurements (cm)  • Quad (10cm above)          R 42.5 L 49  • Superior pole R 43 L 41.5  • Joint l as he just got a membership today. Some rapid fatigue with SL step up with weight RLE causing pain, continue to progress strength/control here. 3/0/138 pre tx PROM RLE compared to LLE 4/0/150. Still working on end range flexion.   Pain with R SL squat at worst 10/10.   o Aggravating factors:everything  o Alleviating Factors:massage, ice, movement  o Quality - burning, sharp and stiffness  o Deep posteriorly,  Superficial down to 3/4 shank anteriorly  o Intermittent  o 24 hour pattern PM is worse    PLAN

## 2021-08-11 ENCOUNTER — APPOINTMENT (OUTPATIENT)
Dept: PHYSICAL THERAPY | Facility: HOSPITAL | Age: 31
End: 2021-08-11
Attending: ORTHOPAEDIC SURGERY
Payer: COMMERCIAL

## 2021-08-18 ENCOUNTER — APPOINTMENT (OUTPATIENT)
Dept: PHYSICAL THERAPY | Facility: HOSPITAL | Age: 31
End: 2021-08-18
Attending: ORTHOPAEDIC SURGERY
Payer: COMMERCIAL

## 2021-08-18 ENCOUNTER — TELEPHONE (OUTPATIENT)
Dept: PHYSICAL THERAPY | Age: 31
End: 2021-08-18

## 2021-08-20 ENCOUNTER — OFFICE VISIT (OUTPATIENT)
Dept: PHYSICAL THERAPY | Facility: HOSPITAL | Age: 31
End: 2021-08-20
Attending: PEDIATRICS
Payer: COMMERCIAL

## 2021-08-20 PROCEDURE — 97110 THERAPEUTIC EXERCISES: CPT

## 2021-08-20 PROCEDURE — 97112 NEUROMUSCULAR REEDUCATION: CPT

## 2021-08-20 NOTE — PROGRESS NOTES
PHYSICAL THERAPY DAILY TREATMENT NOTE  Haylee Body  32year old  male  Diagnosis: R knee arthroscopy, ACL reconstruction with hamstring autograft, medial meniscus repair, PMM, chondral debridement MFC/LFC/Lateral tibial plateau/patella/femoral tro calf sweep and frankenstein 2 laps ea  •  • Dynamic warmup: calf sweep and frankenstein 2 laps ea, dimitris, jog, butt kifran, SKTC •  •    Manual Therapy • TFJ anterior/posterior Gr 3-4 supine and 90 deg  • PFJ  Mobs all directions   • 25' • TFJ posterior Gr 40    4/21  Pt progressing well, pre tx 9-100 and post tx 0-100 passively. Continuing BFR training. Noted suprapatellar swelling, less firm after MFR hypervolt.   Working on Fullscreen retro walking for active use of quad/TKE.  2/10 to 0/10 pain post BFR trainin improved DL squat symmetry and depth. Improved pain with jog trial after DN to quad, improving quad activation. R SL squat much improved as well. Initiating walk jog progression today, some pain around 30-45\" 5/10 but goes back down to 0/10.   Limited b Short Term Goals: (to be met in 15 visits)  · Pt will be independent and compliant with comprehensive HEP to maintain progress achieved in PT   · Pt will improve knee extension ROM to 0 deg to allow proper heel strike during gait and terminal knee extens

## 2021-08-25 ENCOUNTER — APPOINTMENT (OUTPATIENT)
Dept: PHYSICAL THERAPY | Facility: HOSPITAL | Age: 31
End: 2021-08-25
Attending: ORTHOPAEDIC SURGERY
Payer: COMMERCIAL

## 2021-09-01 NOTE — TELEPHONE ENCOUNTER
Additional disab forms received in forms dept. Logged for procesing. Valid [place] hipaa on file.

## 2021-09-15 ENCOUNTER — APPOINTMENT (OUTPATIENT)
Dept: PHYSICAL THERAPY | Facility: HOSPITAL | Age: 31
End: 2021-09-15
Payer: COMMERCIAL

## 2021-09-22 ENCOUNTER — OFFICE VISIT (OUTPATIENT)
Dept: PHYSICAL THERAPY | Facility: HOSPITAL | Age: 31
End: 2021-09-22
Attending: ORTHOPAEDIC SURGERY
Payer: COMMERCIAL

## 2021-09-22 PROCEDURE — 97530 THERAPEUTIC ACTIVITIES: CPT

## 2021-09-22 PROCEDURE — 97112 NEUROMUSCULAR REEDUCATION: CPT

## 2021-09-22 NOTE — PROGRESS NOTES
ProgressSummary  Pt has attended 25 visits in Physical Therapy   .  PHYSICAL THERAPY DAILY TREATMENT NOTE  Governor Zahraa  32year old  male  Diagnosis: R knee arthroscopy, ACL reconstruction with hamstring autograft, medial meniscus repair, PMM, ch ea  • Fan bike 5' 10\" sprint EMOM  • Skater squat 2x10 ea gtb  • Sl leg press 75# L and 35# R 3x10 ea  •  • 5' fan bike 10\" EMOM sprint  • Squat 40# from floor to 7/10 RPE  • [place] carry 25# 48' ea • 6' fan bike 10\" EMOM sprint  • Squat/SL squat x10 e crutches, way too short.   Pt ambulates with 2 point gait pattern, decreased WB RLE, decreased flexion in swing  and decreased terminal extension in terminal stance, TROM +      6/18  1/0/130 improving to 4/0/140 post manual.  Still pain with SL stance RLE, 1/0/112 passive post MT. Great tolerance to loading, still painful SL balance. 4/0/130 improving to 140 deg with manual flexion.   Pain stair descent in knee, DN with stim to quad to address, no pain post.  NV do more prep for RT run, deceleration, in MWM=mobilization with movement, [place]=prone on elbows, STM=soft tissue massage (IASTM=instrument assisted), Dn=dry needling, BFR= blood flow restriction, LOP=limb occlusion pression, rEMOM=rehab every minute on the minute     R Knee  • Pt describes symptom l participate in planning and for this course of care. Thank you for your referral. If you have any questions, please contact me at Dept: [phone].     Sincerely,  Electronically signed by therapist: Carlito Ortega PT     Physician's certification req

## 2021-09-23 ENCOUNTER — OFFICE VISIT (OUTPATIENT)
Dept: ORTHOPEDICS CLINIC | Facility: CLINIC | Age: 31
End: 2021-09-23
Payer: COMMERCIAL

## 2021-09-23 DIAGNOSIS — S83.241D TEAR OF MEDIAL MENISCUS OF RIGHT KNEE, CURRENT, UNSPECIFIED TEAR TYPE, SUBSEQUENT ENCOUNTER: Primary | ICD-10-CM

## 2021-09-23 DIAGNOSIS — S83.511D RUPTURE OF ANTERIOR CRUCIATE LIGAMENT OF RIGHT KNEE, SUBSEQUENT ENCOUNTER: ICD-10-CM

## 2021-09-23 PROCEDURE — 99213 OFFICE O/P EST LOW 20 MIN: CPT | Performed by: PHYSICIAN ASSISTANT

## 2021-09-23 NOTE — PROGRESS NOTES
NURSING INTAKE COMMENTS: Patient presents with: Follow - Up: s/p right knee -- Sx on 03/15/21. Denies any pain. HPI: This 32year old male presents today with complaints of right knee follow-up.   He is 6-month status post ACL reconstruction with med breath, cough, asthma, wheezing  CARDIOVASCULAR: denies chest pain, leg cramps with exertion, palpitations, leg swelling  GI: denies abdominal pain, nausea, vomiting, diarrhea, constipation, hematochezia, worsening heartburn or stomach ulcers  : denies d therapy home exercises. Gradually increase his activity.   We will progress him at work gave him a work note with a couple mild restrictions, anticipate he continues to increase strength and endurance over the next 2 to 4 weeks and anticipate return to [facility]

## 2021-09-24 NOTE — TELEPHONE ENCOUNTER
Dr. Tan Proper,     Please sign off on form:  -Highlight the patient and hit \"Chart\" button.   -In Chart Review, w/in the Encounter tab - click 1 time on the Telephone call encounter for 6/23/21Scroll down the telephone encounter.  -Click \"scan on\" blue H

## 2021-09-29 ENCOUNTER — APPOINTMENT (OUTPATIENT)
Dept: PHYSICAL THERAPY | Facility: HOSPITAL | Age: 31
End: 2021-09-29
Payer: COMMERCIAL

## 2021-10-22 ENCOUNTER — TELEPHONE (OUTPATIENT)
Dept: ORTHOPEDICS CLINIC | Facility: CLINIC | Age: 31
End: 2021-10-22

## 2021-10-22 NOTE — TELEPHONE ENCOUNTER
Per 9/23/21 OV note-  \"We will progress him at work gave him a work note with a couple mild restrictions, anticipate he continues to increase strength and endurance over the next 2 to 4 weeks and anticipate return to work full duty at that time. \"    It h

## 2021-10-27 ENCOUNTER — OFFICE VISIT (OUTPATIENT)
Dept: PHYSICAL THERAPY | Facility: HOSPITAL | Age: 31
End: 2021-10-27
Payer: COMMERCIAL

## 2021-10-27 PROCEDURE — 97110 THERAPEUTIC EXERCISES: CPT

## 2021-10-27 NOTE — PROGRESS NOTES
PHYSICAL THERAPY DAILY TREATMENT NOTE     Subjective: Harvey Rodriguez reports he has been able to return to running about 1 mile 1-2x/week. Has been released fully to return to work doing 9-10 hours/day with no restrictions with lifting/carrying.  No p months before attempting to return to sports with friends in the spring 2022. Nadya Reeves verbalized understanding of all education and instruction and is independent with a HEP for continued management, progression, and prevention of re-injury.  Discharge from P
